# Patient Record
Sex: MALE | Race: WHITE | NOT HISPANIC OR LATINO | Employment: OTHER | URBAN - METROPOLITAN AREA
[De-identification: names, ages, dates, MRNs, and addresses within clinical notes are randomized per-mention and may not be internally consistent; named-entity substitution may affect disease eponyms.]

---

## 2018-08-31 ENCOUNTER — APPOINTMENT (OUTPATIENT)
Dept: PREADMISSION TESTING | Facility: HOSPITAL | Age: 78
End: 2018-08-31

## 2018-08-31 ENCOUNTER — OFFICE VISIT (OUTPATIENT)
Dept: ORTHOPEDIC SURGERY | Facility: CLINIC | Age: 78
End: 2018-08-31

## 2018-08-31 VITALS — OXYGEN SATURATION: 98 % | WEIGHT: 235.89 LBS | HEIGHT: 70 IN | BODY MASS INDEX: 33.77 KG/M2 | HEART RATE: 72 BPM

## 2018-08-31 VITALS — WEIGHT: 235 LBS | HEIGHT: 70 IN | BODY MASS INDEX: 33.64 KG/M2

## 2018-08-31 DIAGNOSIS — G62.9 NEUROPATHY: ICD-10-CM

## 2018-08-31 DIAGNOSIS — Z79.899 POLYPHARMACY: ICD-10-CM

## 2018-08-31 DIAGNOSIS — M25.572 ACUTE LEFT ANKLE PAIN: Primary | ICD-10-CM

## 2018-08-31 DIAGNOSIS — R73.03 PREDIABETES: ICD-10-CM

## 2018-08-31 DIAGNOSIS — I87.8 VENOUS STASIS: ICD-10-CM

## 2018-08-31 DIAGNOSIS — S82.852A CLOSED TRIMALLEOLAR FRACTURE OF LEFT ANKLE, INITIAL ENCOUNTER: ICD-10-CM

## 2018-08-31 DIAGNOSIS — M25.572 ACUTE LEFT ANKLE PAIN: ICD-10-CM

## 2018-08-31 LAB
ANION GAP SERPL CALCULATED.3IONS-SCNC: 1 MMOL/L (ref 3–11)
BASOPHILS # BLD AUTO: 0.02 10*3/MM3 (ref 0–0.2)
BASOPHILS NFR BLD AUTO: 0.2 % (ref 0–1)
BUN BLD-MCNC: 21 MG/DL (ref 9–23)
BUN/CREAT SERPL: 25 (ref 7–25)
CALCIUM SPEC-SCNC: 9.4 MG/DL (ref 8.7–10.4)
CHLORIDE SERPL-SCNC: 105 MMOL/L (ref 99–109)
CO2 SERPL-SCNC: 33 MMOL/L (ref 20–31)
CREAT BLD-MCNC: 0.84 MG/DL (ref 0.6–1.3)
DEPRECATED RDW RBC AUTO: 42.9 FL (ref 37–54)
EOSINOPHIL # BLD AUTO: 0.35 10*3/MM3 (ref 0–0.3)
EOSINOPHIL NFR BLD AUTO: 3.4 % (ref 0–3)
ERYTHROCYTE [DISTWIDTH] IN BLOOD BY AUTOMATED COUNT: 12.6 % (ref 11.3–14.5)
GFR SERPL CREATININE-BSD FRML MDRD: 89 ML/MIN/1.73
GLUCOSE BLD-MCNC: 107 MG/DL (ref 70–100)
HBA1C MFR BLD: 6.5 % (ref 4.8–5.6)
HCT VFR BLD AUTO: 43.9 % (ref 38.9–50.9)
HGB BLD-MCNC: 14.5 G/DL (ref 13.1–17.5)
IMM GRANULOCYTES # BLD: 0.06 10*3/MM3 (ref 0–0.03)
IMM GRANULOCYTES NFR BLD: 0.6 % (ref 0–0.6)
LYMPHOCYTES # BLD AUTO: 1.9 10*3/MM3 (ref 0.6–4.8)
LYMPHOCYTES NFR BLD AUTO: 18.5 % (ref 24–44)
MCH RBC QN AUTO: 31 PG (ref 27–31)
MCHC RBC AUTO-ENTMCNC: 33 G/DL (ref 32–36)
MCV RBC AUTO: 93.8 FL (ref 80–99)
MONOCYTES # BLD AUTO: 1 10*3/MM3 (ref 0–1)
MONOCYTES NFR BLD AUTO: 9.7 % (ref 0–12)
NEUTROPHILS # BLD AUTO: 7.02 10*3/MM3 (ref 1.5–8.3)
NEUTROPHILS NFR BLD AUTO: 68.2 % (ref 41–71)
PLATELET # BLD AUTO: 252 10*3/MM3 (ref 150–450)
PMV BLD AUTO: 11.3 FL (ref 6–12)
POTASSIUM BLD-SCNC: 4.1 MMOL/L (ref 3.5–5.5)
RBC # BLD AUTO: 4.68 10*6/MM3 (ref 4.2–5.76)
SODIUM BLD-SCNC: 139 MMOL/L (ref 132–146)
WBC NRBC COR # BLD: 10.29 10*3/MM3 (ref 3.5–10.8)

## 2018-08-31 PROCEDURE — 83036 HEMOGLOBIN GLYCOSYLATED A1C: CPT | Performed by: ORTHOPAEDIC SURGERY

## 2018-08-31 PROCEDURE — 80048 BASIC METABOLIC PNL TOTAL CA: CPT | Performed by: ORTHOPAEDIC SURGERY

## 2018-08-31 PROCEDURE — 93005 ELECTROCARDIOGRAM TRACING: CPT

## 2018-08-31 PROCEDURE — 99204 OFFICE O/P NEW MOD 45 MIN: CPT | Performed by: ORTHOPAEDIC SURGERY

## 2018-08-31 PROCEDURE — 36415 COLL VENOUS BLD VENIPUNCTURE: CPT

## 2018-08-31 PROCEDURE — 85025 COMPLETE CBC W/AUTO DIFF WBC: CPT | Performed by: ORTHOPAEDIC SURGERY

## 2018-08-31 PROCEDURE — 93010 ELECTROCARDIOGRAM REPORT: CPT | Performed by: INTERNAL MEDICINE

## 2018-08-31 RX ORDER — ASPIRIN 325 MG
325 TABLET ORAL DAILY
Status: ON HOLD | COMMUNITY
End: 2018-09-05

## 2018-08-31 RX ORDER — LOSARTAN POTASSIUM AND HYDROCHLOROTHIAZIDE 25; 100 MG/1; MG/1
1 TABLET ORAL DAILY
COMMUNITY

## 2018-08-31 RX ORDER — HYDROCODONE BITARTRATE AND ACETAMINOPHEN 7.5; 325 MG/1; MG/1
1-2 TABLET ORAL EVERY 6 HOURS PRN
Qty: 60 TABLET | Refills: 0 | Status: SHIPPED | OUTPATIENT
Start: 2018-08-31 | End: 2018-09-21

## 2018-08-31 RX ORDER — ONDANSETRON 4 MG/1
4 TABLET, FILM COATED ORAL EVERY 6 HOURS PRN
Qty: 30 TABLET | Refills: 0 | Status: SHIPPED | OUTPATIENT
Start: 2018-08-31 | End: 2018-08-31

## 2018-08-31 RX ORDER — OXYCODONE HYDROCHLORIDE AND ACETAMINOPHEN 5; 325 MG/1; MG/1
1-2 TABLET ORAL EVERY 6 HOURS PRN
Qty: 60 TABLET | Refills: 0 | Status: SHIPPED | OUTPATIENT
Start: 2018-08-31 | End: 2018-08-31

## 2018-08-31 RX ORDER — CARVEDILOL 12.5 MG/1
12.5 TABLET ORAL 2 TIMES DAILY WITH MEALS
COMMUNITY

## 2018-08-31 RX ORDER — ATORVASTATIN CALCIUM 20 MG/1
20 TABLET, FILM COATED ORAL DAILY
COMMUNITY

## 2018-08-31 RX ORDER — NAPROXEN 250 MG/1
250 TABLET ORAL 2 TIMES DAILY PRN
COMMUNITY
End: 2018-09-05 | Stop reason: HOSPADM

## 2018-08-31 RX ORDER — ASCORBIC ACID 500 MG
500 TABLET ORAL DAILY
COMMUNITY

## 2018-08-31 RX ORDER — CHOLECALCIFEROL (VITAMIN D3) 50 MCG
2000 TABLET ORAL DAILY
COMMUNITY

## 2018-09-03 ENCOUNTER — ANESTHESIA EVENT (OUTPATIENT)
Dept: PERIOP | Facility: HOSPITAL | Age: 78
End: 2018-09-03

## 2018-09-03 RX ORDER — FAMOTIDINE 10 MG/ML
20 INJECTION, SOLUTION INTRAVENOUS ONCE
Status: CANCELLED | OUTPATIENT
Start: 2018-09-03 | End: 2018-09-03

## 2018-09-04 ENCOUNTER — ANESTHESIA (OUTPATIENT)
Dept: PERIOP | Facility: HOSPITAL | Age: 78
End: 2018-09-04

## 2018-09-04 ENCOUNTER — APPOINTMENT (OUTPATIENT)
Dept: OTHER | Facility: HOSPITAL | Age: 78
End: 2018-09-04
Attending: ORTHOPAEDIC SURGERY

## 2018-09-04 ENCOUNTER — HOSPITAL ENCOUNTER (OUTPATIENT)
Facility: HOSPITAL | Age: 78
Discharge: HOME | End: 2018-09-05
Attending: ORTHOPAEDIC SURGERY | Admitting: ANESTHESIOLOGY

## 2018-09-04 ENCOUNTER — APPOINTMENT (OUTPATIENT)
Dept: GENERAL RADIOLOGY | Facility: HOSPITAL | Age: 78
End: 2018-09-04

## 2018-09-04 DIAGNOSIS — Z74.09 IMPAIRED FUNCTIONAL MOBILITY, BALANCE, GAIT, AND ENDURANCE: Primary | ICD-10-CM

## 2018-09-04 DIAGNOSIS — M25.572 ACUTE LEFT ANKLE PAIN: ICD-10-CM

## 2018-09-04 PROBLEM — I10 HTN (HYPERTENSION): Status: ACTIVE | Noted: 2018-09-04

## 2018-09-04 PROBLEM — E78.5 HLD (HYPERLIPIDEMIA): Status: ACTIVE | Noted: 2018-09-04

## 2018-09-04 PROBLEM — E03.9 HYPOTHYROID: Status: ACTIVE | Noted: 2018-09-04

## 2018-09-04 PROBLEM — Z87.81 S/P ORIF (OPEN REDUCTION INTERNAL FIXATION) FRACTURE: Status: ACTIVE | Noted: 2018-09-04

## 2018-09-04 PROBLEM — Z98.890 S/P ORIF (OPEN REDUCTION INTERNAL FIXATION) FRACTURE: Status: ACTIVE | Noted: 2018-09-04

## 2018-09-04 LAB
GLUCOSE BLDC GLUCOMTR-MCNC: 194 MG/DL (ref 70–130)
GLUCOSE BLDC GLUCOMTR-MCNC: 200 MG/DL (ref 70–130)
POTASSIUM BLDA-SCNC: 3.79 MMOL/L (ref 3.5–5.3)

## 2018-09-04 PROCEDURE — C1713 ANCHOR/SCREW BN/BN,TIS/BN: HCPCS | Performed by: ORTHOPAEDIC SURGERY

## 2018-09-04 PROCEDURE — 63710000001 CARVEDILOL 12.5 MG TABLET: Performed by: NURSE PRACTITIONER

## 2018-09-04 PROCEDURE — 27829 TREAT LOWER LEG JOINT: CPT | Performed by: ORTHOPAEDIC SURGERY

## 2018-09-04 PROCEDURE — 25010000002 MIDAZOLAM PER 1 MG: Performed by: ANESTHESIOLOGY

## 2018-09-04 PROCEDURE — 25010000002 BUPRENORPHINE PER 0.1 MG: Performed by: ANESTHESIOLOGY

## 2018-09-04 PROCEDURE — 25010000002 FENTANYL CITRATE (PF) 100 MCG/2ML SOLUTION: Performed by: ANESTHESIOLOGY

## 2018-09-04 PROCEDURE — A9270 NON-COVERED ITEM OR SERVICE: HCPCS | Performed by: ORTHOPAEDIC SURGERY

## 2018-09-04 PROCEDURE — 27822 TREATMENT OF ANKLE FRACTURE: CPT | Performed by: ORTHOPAEDIC SURGERY

## 2018-09-04 PROCEDURE — 76000 FLUOROSCOPY <1 HR PHYS/QHP: CPT

## 2018-09-04 PROCEDURE — 25810000003 POTASSIUM CHLORIDE PER 2 MEQ: Performed by: ORTHOPAEDIC SURGERY

## 2018-09-04 PROCEDURE — 94799 UNLISTED PULMONARY SVC/PX: CPT

## 2018-09-04 PROCEDURE — 25010000002 FENTANYL CITRATE (PF) 100 MCG/2ML SOLUTION: Performed by: NURSE ANESTHETIST, CERTIFIED REGISTERED

## 2018-09-04 PROCEDURE — A9270 NON-COVERED ITEM OR SERVICE: HCPCS | Performed by: NURSE PRACTITIONER

## 2018-09-04 PROCEDURE — 25010000002 DEXAMETHASONE SODIUM PHOSPHATE 10 MG/ML SOLUTION 1 ML VIAL: Performed by: ANESTHESIOLOGY

## 2018-09-04 PROCEDURE — 84132 ASSAY OF SERUM POTASSIUM: CPT | Performed by: ANESTHESIOLOGY

## 2018-09-04 PROCEDURE — 94760 N-INVAS EAR/PLS OXIMETRY 1: CPT

## 2018-09-04 PROCEDURE — 63710000001: Performed by: ORTHOPAEDIC SURGERY

## 2018-09-04 PROCEDURE — A9270 NON-COVERED ITEM OR SERVICE: HCPCS | Performed by: ANESTHESIOLOGY

## 2018-09-04 PROCEDURE — 63710000001 INSULIN LISPRO (HUMAN) PER 5 UNITS: Performed by: NURSE PRACTITIONER

## 2018-09-04 PROCEDURE — 25010000003 CEFAZOLIN IN DEXTROSE 2-4 GM/100ML-% SOLUTION: Performed by: ORTHOPAEDIC SURGERY

## 2018-09-04 PROCEDURE — 25010000002 PROPOFOL 10 MG/ML EMULSION: Performed by: NURSE ANESTHETIST, CERTIFIED REGISTERED

## 2018-09-04 PROCEDURE — 63710000001 MULTIVITAMIN TABLET: Performed by: ORTHOPAEDIC SURGERY

## 2018-09-04 PROCEDURE — C1769 GUIDE WIRE: HCPCS | Performed by: ORTHOPAEDIC SURGERY

## 2018-09-04 PROCEDURE — 76001 HC FLUORO GREATER THAN 1 HOUR: CPT

## 2018-09-04 PROCEDURE — 63710000001 FAMOTIDINE 20 MG TABLET: Performed by: ANESTHESIOLOGY

## 2018-09-04 PROCEDURE — 25010000002 ROPIVACAINE HCL-NACL 0.2-0.9 % SOLUTION: Performed by: ANESTHESIOLOGY

## 2018-09-04 PROCEDURE — 82962 GLUCOSE BLOOD TEST: CPT

## 2018-09-04 DEVICE — SCRW LK ST STRDRV 2.7X16MM: Type: IMPLANTABLE DEVICE | Site: ANKLE | Status: FUNCTIONAL

## 2018-09-04 DEVICE — SCRW CANN SHRT THRD 1/3 4X46MM: Type: IMPLANTABLE DEVICE | Site: ANKLE | Status: FUNCTIONAL

## 2018-09-04 DEVICE — SCRW CORT S/TAP 3.5X48MM: Type: IMPLANTABLE DEVICE | Site: ANKLE | Status: FUNCTIONAL

## 2018-09-04 DEVICE — SCRW CORT S/TAP 2.7X20MM: Type: IMPLANTABLE DEVICE | Site: ANKLE | Status: FUNCTIONAL

## 2018-09-04 DEVICE — SCRW CANC FUL/THRD 4.0X16MM: Type: IMPLANTABLE DEVICE | Site: ANKLE | Status: FUNCTIONAL

## 2018-09-04 DEVICE — PLT FIB LCP L/D 4H 2.7/3.5X86LT: Type: IMPLANTABLE DEVICE | Site: ANKLE | Status: FUNCTIONAL

## 2018-09-04 DEVICE — SCRW CORT S/TAP 3.5X18MM: Type: IMPLANTABLE DEVICE | Site: ANKLE | Status: FUNCTIONAL

## 2018-09-04 DEVICE — SCRW LK ST STRDRV 2.7X14MM: Type: IMPLANTABLE DEVICE | Site: ANKLE | Status: FUNCTIONAL

## 2018-09-04 DEVICE — SCRW CORT S/TAP 3.5X16MM: Type: IMPLANTABLE DEVICE | Site: ANKLE | Status: FUNCTIONAL

## 2018-09-04 DEVICE — SCRW LK ST STRDRV 2.7X18MM: Type: IMPLANTABLE DEVICE | Site: ANKLE | Status: FUNCTIONAL

## 2018-09-04 DEVICE — SCRW CANN SHRT THRD 1/3 4X44MM: Type: IMPLANTABLE DEVICE | Site: ANKLE | Status: FUNCTIONAL

## 2018-09-04 RX ORDER — ONDANSETRON 2 MG/ML
4 INJECTION INTRAMUSCULAR; INTRAVENOUS ONCE AS NEEDED
Status: DISCONTINUED | OUTPATIENT
Start: 2018-09-04 | End: 2018-09-04 | Stop reason: HOSPADM

## 2018-09-04 RX ORDER — PROPOFOL 10 MG/ML
VIAL (ML) INTRAVENOUS AS NEEDED
Status: DISCONTINUED | OUTPATIENT
Start: 2018-09-04 | End: 2018-09-04 | Stop reason: SURG

## 2018-09-04 RX ORDER — HYDRALAZINE HYDROCHLORIDE 20 MG/ML
5 INJECTION INTRAMUSCULAR; INTRAVENOUS
Status: DISCONTINUED | OUTPATIENT
Start: 2018-09-04 | End: 2018-09-04 | Stop reason: HOSPADM

## 2018-09-04 RX ORDER — CARVEDILOL 12.5 MG/1
12.5 TABLET ORAL 2 TIMES DAILY WITH MEALS
Status: DISCONTINUED | OUTPATIENT
Start: 2018-09-04 | End: 2018-09-05 | Stop reason: HOSPADM

## 2018-09-04 RX ORDER — BUPIVACAINE HYDROCHLORIDE 2.5 MG/ML
INJECTION, SOLUTION EPIDURAL; INFILTRATION; INTRACAUDAL AS NEEDED
Status: DISCONTINUED | OUTPATIENT
Start: 2018-09-04 | End: 2018-09-04 | Stop reason: SURG

## 2018-09-04 RX ORDER — ONDANSETRON 4 MG/1
4 TABLET, FILM COATED ORAL EVERY 6 HOURS PRN
Status: DISCONTINUED | OUTPATIENT
Start: 2018-09-04 | End: 2018-09-05 | Stop reason: HOSPADM

## 2018-09-04 RX ORDER — NALOXONE HCL 0.4 MG/ML
0.4 VIAL (ML) INJECTION AS NEEDED
Status: DISCONTINUED | OUTPATIENT
Start: 2018-09-04 | End: 2018-09-04 | Stop reason: HOSPADM

## 2018-09-04 RX ORDER — LIDOCAINE HYDROCHLORIDE 10 MG/ML
0.5 INJECTION, SOLUTION EPIDURAL; INFILTRATION; INTRACAUDAL; PERINEURAL ONCE AS NEEDED
Status: COMPLETED | OUTPATIENT
Start: 2018-09-04 | End: 2018-09-04

## 2018-09-04 RX ORDER — LOSARTAN POTASSIUM 50 MG/1
100 TABLET ORAL
Status: DISCONTINUED | OUTPATIENT
Start: 2018-09-05 | End: 2018-09-05 | Stop reason: HOSPADM

## 2018-09-04 RX ORDER — DIPHENHYDRAMINE HYDROCHLORIDE 50 MG/ML
25 INJECTION INTRAMUSCULAR; INTRAVENOUS NIGHTLY PRN
Status: DISCONTINUED | OUTPATIENT
Start: 2018-09-04 | End: 2018-09-05 | Stop reason: HOSPADM

## 2018-09-04 RX ORDER — BISACODYL 10 MG
10 SUPPOSITORY, RECTAL RECTAL DAILY PRN
Status: DISCONTINUED | OUTPATIENT
Start: 2018-09-04 | End: 2018-09-05 | Stop reason: HOSPADM

## 2018-09-04 RX ORDER — ONDANSETRON 2 MG/ML
4 INJECTION INTRAMUSCULAR; INTRAVENOUS EVERY 6 HOURS PRN
Status: DISCONTINUED | OUTPATIENT
Start: 2018-09-04 | End: 2018-09-05 | Stop reason: HOSPADM

## 2018-09-04 RX ORDER — OXYCODONE HYDROCHLORIDE AND ACETAMINOPHEN 5; 325 MG/1; MG/1
2 TABLET ORAL EVERY 4 HOURS PRN
Status: DISCONTINUED | OUTPATIENT
Start: 2018-09-04 | End: 2018-09-05 | Stop reason: HOSPADM

## 2018-09-04 RX ORDER — NALOXONE HCL 0.4 MG/ML
0.4 VIAL (ML) INJECTION
Status: DISCONTINUED | OUTPATIENT
Start: 2018-09-04 | End: 2018-09-05 | Stop reason: HOSPADM

## 2018-09-04 RX ORDER — PROMETHAZINE HYDROCHLORIDE 25 MG/ML
12.5 INJECTION, SOLUTION INTRAMUSCULAR; INTRAVENOUS EVERY 4 HOURS PRN
Status: DISCONTINUED | OUTPATIENT
Start: 2018-09-04 | End: 2018-09-05 | Stop reason: HOSPADM

## 2018-09-04 RX ORDER — LEVOTHYROXINE SODIUM 0.05 MG/1
50 TABLET ORAL
Status: DISCONTINUED | OUTPATIENT
Start: 2018-09-05 | End: 2018-09-05 | Stop reason: HOSPADM

## 2018-09-04 RX ORDER — HYDROCODONE BITARTRATE AND ACETAMINOPHEN 7.5; 325 MG/1; MG/1
2 TABLET ORAL EVERY 4 HOURS PRN
Status: DISCONTINUED | OUTPATIENT
Start: 2018-09-04 | End: 2018-09-05 | Stop reason: HOSPADM

## 2018-09-04 RX ORDER — PROMETHAZINE HYDROCHLORIDE 25 MG/ML
6.25 INJECTION, SOLUTION INTRAMUSCULAR; INTRAVENOUS ONCE AS NEEDED
Status: DISCONTINUED | OUTPATIENT
Start: 2018-09-04 | End: 2018-09-04 | Stop reason: HOSPADM

## 2018-09-04 RX ORDER — IPRATROPIUM BROMIDE AND ALBUTEROL SULFATE 2.5; .5 MG/3ML; MG/3ML
3 SOLUTION RESPIRATORY (INHALATION) ONCE AS NEEDED
Status: COMPLETED | OUTPATIENT
Start: 2018-09-04 | End: 2018-09-04

## 2018-09-04 RX ORDER — SODIUM CHLORIDE 0.9 % (FLUSH) 0.9 %
1-10 SYRINGE (ML) INJECTION AS NEEDED
Status: DISCONTINUED | OUTPATIENT
Start: 2018-09-04 | End: 2018-09-04 | Stop reason: HOSPADM

## 2018-09-04 RX ORDER — LABETALOL HYDROCHLORIDE 5 MG/ML
5 INJECTION, SOLUTION INTRAVENOUS
Status: DISCONTINUED | OUTPATIENT
Start: 2018-09-04 | End: 2018-09-04 | Stop reason: HOSPADM

## 2018-09-04 RX ORDER — DEXTROSE MONOHYDRATE 25 G/50ML
25 INJECTION, SOLUTION INTRAVENOUS
Status: DISCONTINUED | OUTPATIENT
Start: 2018-09-04 | End: 2018-09-05 | Stop reason: HOSPADM

## 2018-09-04 RX ORDER — HYDROCODONE BITARTRATE AND ACETAMINOPHEN 5; 325 MG/1; MG/1
1 TABLET ORAL ONCE AS NEEDED
Status: DISCONTINUED | OUTPATIENT
Start: 2018-09-04 | End: 2018-09-04 | Stop reason: HOSPADM

## 2018-09-04 RX ORDER — FAMOTIDINE 20 MG/1
20 TABLET, FILM COATED ORAL ONCE
Status: COMPLETED | OUTPATIENT
Start: 2018-09-04 | End: 2018-09-04

## 2018-09-04 RX ORDER — FENTANYL CITRATE 50 UG/ML
50 INJECTION, SOLUTION INTRAMUSCULAR; INTRAVENOUS
Status: DISCONTINUED | OUTPATIENT
Start: 2018-09-04 | End: 2018-09-04 | Stop reason: HOSPADM

## 2018-09-04 RX ORDER — NICOTINE POLACRILEX 4 MG
15 LOZENGE BUCCAL
Status: DISCONTINUED | OUTPATIENT
Start: 2018-09-04 | End: 2018-09-05 | Stop reason: HOSPADM

## 2018-09-04 RX ORDER — PROMETHAZINE HYDROCHLORIDE 25 MG/1
25 TABLET ORAL ONCE AS NEEDED
Status: DISCONTINUED | OUTPATIENT
Start: 2018-09-04 | End: 2018-09-04 | Stop reason: HOSPADM

## 2018-09-04 RX ORDER — ROPIVACAINE IN 0.9% SOD CHL/PF 0.2% 545ML
6 ELASTOMERIC PUMP, HI VARIABLE RATE INJECTION CONTINUOUS
Status: DISCONTINUED | OUTPATIENT
Start: 2018-09-04 | End: 2018-09-05 | Stop reason: HOSPADM

## 2018-09-04 RX ORDER — CEFAZOLIN SODIUM 2 G/100ML
2 INJECTION, SOLUTION INTRAVENOUS EVERY 8 HOURS
Status: DISCONTINUED | OUTPATIENT
Start: 2018-09-04 | End: 2018-09-05 | Stop reason: HOSPADM

## 2018-09-04 RX ORDER — FENTANYL CITRATE 50 UG/ML
INJECTION, SOLUTION INTRAMUSCULAR; INTRAVENOUS AS NEEDED
Status: DISCONTINUED | OUTPATIENT
Start: 2018-09-04 | End: 2018-09-04 | Stop reason: SURG

## 2018-09-04 RX ORDER — SODIUM CHLORIDE AND POTASSIUM CHLORIDE 150; 450 MG/100ML; MG/100ML
75 INJECTION, SOLUTION INTRAVENOUS CONTINUOUS
Status: DISCONTINUED | OUTPATIENT
Start: 2018-09-04 | End: 2018-09-05 | Stop reason: HOSPADM

## 2018-09-04 RX ORDER — MIDAZOLAM HYDROCHLORIDE 1 MG/ML
INJECTION INTRAMUSCULAR; INTRAVENOUS AS NEEDED
Status: DISCONTINUED | OUTPATIENT
Start: 2018-09-04 | End: 2018-09-04 | Stop reason: SURG

## 2018-09-04 RX ORDER — SODIUM CHLORIDE, SODIUM LACTATE, POTASSIUM CHLORIDE, CALCIUM CHLORIDE 600; 310; 30; 20 MG/100ML; MG/100ML; MG/100ML; MG/100ML
9 INJECTION, SOLUTION INTRAVENOUS CONTINUOUS
Status: DISCONTINUED | OUTPATIENT
Start: 2018-09-04 | End: 2018-09-05 | Stop reason: HOSPADM

## 2018-09-04 RX ORDER — PROMETHAZINE HYDROCHLORIDE 25 MG/1
25 SUPPOSITORY RECTAL ONCE AS NEEDED
Status: DISCONTINUED | OUTPATIENT
Start: 2018-09-04 | End: 2018-09-04 | Stop reason: HOSPADM

## 2018-09-04 RX ORDER — HYDROMORPHONE HYDROCHLORIDE 1 MG/ML
0.5 INJECTION, SOLUTION INTRAMUSCULAR; INTRAVENOUS; SUBCUTANEOUS
Status: DISCONTINUED | OUTPATIENT
Start: 2018-09-04 | End: 2018-09-04 | Stop reason: HOSPADM

## 2018-09-04 RX ORDER — CEFAZOLIN SODIUM 2 G/100ML
2 INJECTION, SOLUTION INTRAVENOUS ONCE
Status: COMPLETED | OUTPATIENT
Start: 2018-09-04 | End: 2018-09-04

## 2018-09-04 RX ORDER — LIDOCAINE HYDROCHLORIDE 10 MG/ML
INJECTION, SOLUTION EPIDURAL; INFILTRATION; INTRACAUDAL; PERINEURAL AS NEEDED
Status: DISCONTINUED | OUTPATIENT
Start: 2018-09-04 | End: 2018-09-04 | Stop reason: SURG

## 2018-09-04 RX ORDER — MEPERIDINE HYDROCHLORIDE 25 MG/ML
12.5 INJECTION INTRAMUSCULAR; INTRAVENOUS; SUBCUTANEOUS
Status: DISCONTINUED | OUTPATIENT
Start: 2018-09-04 | End: 2018-09-04 | Stop reason: HOSPADM

## 2018-09-04 RX ORDER — ALBUTEROL SULFATE 2.5 MG/3ML
2.5 SOLUTION RESPIRATORY (INHALATION) EVERY 6 HOURS PRN
Status: DISCONTINUED | OUTPATIENT
Start: 2018-09-04 | End: 2018-09-05 | Stop reason: HOSPADM

## 2018-09-04 RX ORDER — DIPHENHYDRAMINE HCL 25 MG
25 CAPSULE ORAL NIGHTLY PRN
Status: DISCONTINUED | OUTPATIENT
Start: 2018-09-04 | End: 2018-09-05 | Stop reason: HOSPADM

## 2018-09-04 RX ORDER — ATORVASTATIN CALCIUM 20 MG/1
20 TABLET, FILM COATED ORAL DAILY
Status: DISCONTINUED | OUTPATIENT
Start: 2018-09-05 | End: 2018-09-05 | Stop reason: HOSPADM

## 2018-09-04 RX ORDER — HYDROCODONE BITARTRATE AND ACETAMINOPHEN 7.5; 325 MG/1; MG/1
1 TABLET ORAL EVERY 4 HOURS PRN
Status: DISCONTINUED | OUTPATIENT
Start: 2018-09-04 | End: 2018-09-05 | Stop reason: HOSPADM

## 2018-09-04 RX ORDER — OXYCODONE HYDROCHLORIDE AND ACETAMINOPHEN 5; 325 MG/1; MG/1
1 TABLET ORAL EVERY 4 HOURS PRN
Status: DISCONTINUED | OUTPATIENT
Start: 2018-09-04 | End: 2018-09-05 | Stop reason: HOSPADM

## 2018-09-04 RX ORDER — LABETALOL HYDROCHLORIDE 5 MG/ML
10 INJECTION, SOLUTION INTRAVENOUS EVERY 4 HOURS PRN
Status: DISCONTINUED | OUTPATIENT
Start: 2018-09-04 | End: 2018-09-05 | Stop reason: HOSPADM

## 2018-09-04 RX ORDER — DIPHENOXYLATE HYDROCHLORIDE AND ATROPINE SULFATE 2.5; .025 MG/1; MG/1
1 TABLET ORAL DAILY
Status: DISCONTINUED | OUTPATIENT
Start: 2018-09-04 | End: 2018-09-05 | Stop reason: HOSPADM

## 2018-09-04 RX ORDER — MUPIROCIN CALCIUM 20 MG/G
CREAM TOPICAL AS NEEDED
Status: DISCONTINUED | OUTPATIENT
Start: 2018-09-04 | End: 2018-09-04 | Stop reason: HOSPADM

## 2018-09-04 RX ADMIN — LIDOCAINE HYDROCHLORIDE 0.5 ML: 10 INJECTION, SOLUTION EPIDURAL; INFILTRATION; INTRACAUDAL; PERINEURAL at 10:26

## 2018-09-04 RX ADMIN — PROPOFOL 125 MG: 10 INJECTION, EMULSION INTRAVENOUS at 12:58

## 2018-09-04 RX ADMIN — MIDAZOLAM HYDROCHLORIDE 2 MG: 1 INJECTION, SOLUTION INTRAMUSCULAR; INTRAVENOUS at 10:57

## 2018-09-04 RX ADMIN — CEFAZOLIN SODIUM 2 G: 2 INJECTION, SOLUTION INTRAVENOUS at 12:51

## 2018-09-04 RX ADMIN — POTASSIUM CHLORIDE AND SODIUM CHLORIDE 75 ML/HR: 450; 150 INJECTION, SOLUTION INTRAVENOUS at 18:51

## 2018-09-04 RX ADMIN — LIDOCAINE HYDROCHLORIDE 50 MG: 10 INJECTION, SOLUTION EPIDURAL; INFILTRATION; INTRACAUDAL; PERINEURAL at 12:58

## 2018-09-04 RX ADMIN — Medication 1 TABLET: at 18:50

## 2018-09-04 RX ADMIN — CARVEDILOL 12.5 MG: 12.5 TABLET, FILM COATED ORAL at 18:14

## 2018-09-04 RX ADMIN — Medication 6 ML/HR: at 15:10

## 2018-09-04 RX ADMIN — FENTANYL CITRATE 100 MCG: 50 INJECTION, SOLUTION INTRAMUSCULAR; INTRAVENOUS at 10:57

## 2018-09-04 RX ADMIN — INSULIN LISPRO 2 UNITS: 100 INJECTION, SOLUTION INTRAVENOUS; SUBCUTANEOUS at 21:18

## 2018-09-04 RX ADMIN — DEXAMETHASONE SODIUM PHOSPHATE 20 ML: 10 INJECTION, SOLUTION INTRAMUSCULAR; INTRAVENOUS at 10:48

## 2018-09-04 RX ADMIN — IPRATROPIUM BROMIDE AND ALBUTEROL SULFATE 3 ML: 2.5; .5 SOLUTION RESPIRATORY (INHALATION) at 16:03

## 2018-09-04 RX ADMIN — FENTANYL CITRATE 50 MCG: 50 INJECTION INTRAMUSCULAR; INTRAVENOUS at 16:00

## 2018-09-04 RX ADMIN — CEFAZOLIN SODIUM 1 G: 2 INJECTION, SOLUTION INTRAVENOUS at 13:35

## 2018-09-04 RX ADMIN — INSULIN LISPRO 3 UNITS: 100 INJECTION, SOLUTION INTRAVENOUS; SUBCUTANEOUS at 18:15

## 2018-09-04 RX ADMIN — SODIUM CHLORIDE, POTASSIUM CHLORIDE, SODIUM LACTATE AND CALCIUM CHLORIDE 9 ML/HR: 600; 310; 30; 20 INJECTION, SOLUTION INTRAVENOUS at 10:26

## 2018-09-04 RX ADMIN — CEFAZOLIN SODIUM 2 G: 2 INJECTION, SOLUTION INTRAVENOUS at 21:16

## 2018-09-04 RX ADMIN — BUPIVACAINE HYDROCHLORIDE 30 ML: 2.5 INJECTION, SOLUTION EPIDURAL; INFILTRATION; INTRACAUDAL; PERINEURAL at 10:57

## 2018-09-04 RX ADMIN — FENTANYL CITRATE 50 MCG: 50 INJECTION INTRAMUSCULAR; INTRAVENOUS at 16:13

## 2018-09-04 RX ADMIN — FAMOTIDINE 20 MG: 20 TABLET ORAL at 10:26

## 2018-09-04 NOTE — ANESTHESIA POSTPROCEDURE EVALUATION
Patient: Antione Crum    Procedure Summary     Date:  09/04/18 Room / Location:   [facility]    Anesthesia Start:  1252 Anesthesia Stop:  1529    Procedure:  OPEN REDUCTION INTERNAL FIXATION LEFT TRIMALL ANKLE FRACTURE   (Left Ankle) Diagnosis:       Acute left ankle pain      (Acute left ankle pain [M25.572])    Surgeon:  Cheryl Palmer MD Provider:  Damien Eugene MD    Anesthesia Type:  general ASA Status:  3          Anesthesia Type: general  Last vitals  BP   (!) 86/66 (09/04/18 1523)   Temp   98 °F (36.7 °C) (09/04/18 1523)   Pulse   81 (09/04/18 1523)   Resp   24 (09/04/18 1523)     SpO2   95 % (09/04/18 1523)     Post Anesthesia Care and Evaluation    Patient location during evaluation: PACU  Patient participation: complete - patient participated  Level of consciousness: responsive to verbal stimuli  Pain management: adequate  Airway patency: patent  Anesthetic complications: No anesthetic complications  PONV Status: none  Cardiovascular status: hemodynamically stable and acceptable  Respiratory status: nonlabored ventilation, acceptable and nasal cannula  Hydration status: acceptable

## 2018-09-04 NOTE — BRIEF OP NOTE
Orthopedics OPEN REDUCTION INTERNAL FIXATION LEFT TRIMALL ANKLE FRACTURE    Brief Op Note    Antione Crum  9/4/2018    Pre-op Diagnosis:   Acute left ankle pain [M25.572] left trimalleolar ankle fracture, syndesmotic disruption    Post-op Diagnosis:  same       Post-Op Diagnosis Codes:     * Acute left ankle pain [M25.572]    Procedure(s):  OPEN REDUCTION INTERNAL FIXATION LEFT TRIMALL ANKLE FRACTURE      Surgeon(s):  Cheryl Palmer MD    Anesthesia:  General with Block    Staff:   Circulator: Gena Arroyo RN; Marlyn Hernandez RN; Kayli Selby RN  Physician Assistant: Elissa Hyman PA  Radiology Technologist: Bk Brown RT; Yvette Nayak  Scrub Person: Sherly Pollack; Arturo Mahmood RN  Vendor Representative: Chase Chanel    Estimated Blood Loss:10cc      Specimens: none      Drains:  none    Complications:  None    Tourniquet:: 112min    Dressing:splint    Disposition:rr stable    Cheryl Palmer MD     Date: 9/4/2018  Time: 3:20 PM

## 2018-09-04 NOTE — OP NOTE
Operative Report    09/04/18  3:20 PM    Preoperative diagnosis: left trimalleolar ankle fracture with syndesmotic disruption    Postoperative diagnosis: Same    Anesthesia: Gen. with blocks for postop pain control    Surgeon: Cheryl Riggs M.D.    Assistant: baltazar BROWN, she was present for the entire procedure including prepping, draping, retraction, closure, dressing.        Modifier 22 is appended to this procedure because it became much more complex due to the patient's non-compliance.  He walked on the fracture and made it much worse, the bone was impacted and comminuted, much more difficult to reduce and fix both the fibula and the medial malleolus.  The procedure took double the time usual for this type of fracture.  (2 hours of tourniquet time instead of one)     Operative procedure:1.  ORIF left trimalleolar ankle fracture (without fixation of posterior fragment)  2. ORIF syndesmosis      Operative indications: This is a 78-year-old gentleman with a very difficult problem.  He sustained a displaced unstable left trimalleolar ankle fracture on August 21, 2018.  He was seen at [place] emergency room and placed in a splint.  He was then seen at [facility] orthopedics.  He proceeded to walk on the splint.  Surgery was recommended and he came to me on August 31, 2018, for a second opinion.  He walked into the office with a broken and dirty splint.  He reports he had only minimal pain.  He reports he does not have any history of neuropathy, but he must have some type of neuropathic problem in order to be able to walk on a displaced trimalleolar ankle fracture.  By the time I saw him in the office he had displaced the fracture further compared with the initial x-rays.  I recommended surgery because of the unstable nature of the fracture.  Patient reports he is not diabetic, but we found his hemoglobin A1c was 6.5 on admission.  I suspect that's where the neuropathy originates.  The patient also has severe  "venous stasis with Brown indurated skin from the ankle to the knee.  The skin is woody and thickened.  No active ulcerations at the time of surgery.  The swelling has diminished because we placed him in a new fiberglass splint and had him elevate all weekend.  However he has not been compliant with nonweightbearing.  He reports he \"tries to do it\" but has not been nonweightbearing.  I counseled him regarding the extremely high risk of complication including amputation if he walks on this.  The risk of hardware failure, Charcot joint, infection, and amputation are significant.  In this setting with neuropathy he has 50% risk of surgical complication from minor to severe.  I discussed this in depth with the patient and his wife.  I'm very concerned about his compliance.  I am concerned because he does not seem to be convinced regarding the importance of compliance.  I will discuss this in detail with his wife and any other family members also so they may help.    Operative procedure: The patient was taken to the operating room where general anesthesia was induced without difficulty.  They were given preoperative blocks and antibiotics.  The left leg was prepped and draped in the usual sterile fashion.  The appropriate timeout was called.  The leg was elevated, wrapped with an Esmarch, and the tourniquet inflated to 350 mmHg.  Tourniquet time was 114 minutes.  I made a 10 cm incision laterally over the fibula.  This was carried down through sharp tissue bluntly, care was taken to create full-thickness flaps.  The fibula was dissected subperiosteally.  It was very soft, and comminuted.  It also was impacted.  The distal fragment was impacted into the soft bone.  This was due to the preoperative ambulation.  I gently disimpacted it.  I was careful to avoid compressing the bone anymore.  The reduction was quite difficult because of the comminution and the compression.  All the granulation tissue was removed.  With " manipulation ultimately the fracture was reduced anatomically.  I pinned it initially with a K wire from the small fragment set.  I then chose a Synthes fibular plate and contoured it as needed.  It was placed across the fracture and the screw holes were filled in standard fashion.  They were adjusted for length as needed under C-arm guidance.  C-arm in the AP, lateral, mortise planes were used to make sure the alignment was anatomic and the fracture well reduced, and the screws were of the appropriate position and length.    Attention was then directed medially.  I made a 6 cm incision over the medial malleolus and carried this down through soft tissue bluntly.  The fracture was opened.  It was also quite comminuted.  I removed intervening periosteum and granulation tissue.  I was able to peek into the joint, there was some cartilage superficial scuffing.  This was irrigated thoroughly.  The fracture was then reduced anatomically and pinned with the guidewires for the 4.0 cannulated screws.  The appropriate screws were placed.  C-arm confirmed anatomic alignment.  A confirmed appropriate position and length of the hardware.  I then tested the syndesmosis and found it to be unstable.  I therefore placed a syndesmotic screw through the plate through the fibula and into the tibia.  This was done with the ankle held in neutral, and a bone-holding clamp compressing the syndesmosis.  C-arm again confirmed anatomic alignment, appropriate position and length of the hardware.  The incisions were irrigated copiously with antibiotic solution.  They were closed in layers with Monocryl and nylon.  The incisions were dressed sterilely.  He was placed in a 3 sided splint with the ankle in neutral.  Tourniquet was released.  He was awakened, extubated and transferred to recovery room in stable condition.  Postop plan will be admission for elevation, glucose control, pain control.       Estimated blood loss: 10 cc    Specimens:  None    Drains: None    Complications: None    Cheryl Palmer MD  09/04/18  3:20 PM

## 2018-09-04 NOTE — NURSING NOTE
Acute Pain Service:  Peripheral nerve catheter and disposable infusion device teaching completed with patient and spouse,Arminda.  Video demonstration, handout and bracelet provided with [facility] on call central phone number.  Instructed to call with any questions or concerns.  Patient verbalized understanding.  Service will continue to follow until catheter DC'd.  Please contact patient at [phone] or spouse Arminda at [phone] if needed.

## 2018-09-04 NOTE — H&P
Patient Name: Antione Crum  MRN: [id number]  : 1940  DOS: 2018    Attending: Cheryl Palmer MD    Primary Care Provider: Michael Kidd MD      Chief complaint:  left ankle pain    Subjective   Patient is a 78 y.o. male presented for ORIF left trimalleolar fracture by Dr. Palmer under [place]. He tolerated surgery well and is admitted for further medical management. He fractured the ankle in a fall on .    When seen in PACU she is drowsy and restless. He denies pain. He does report shortness of breath. He was 89% saturation on RA in preop. He denies nausea or chest pain. No hx of DVT or PE.     ( Above is noted/ agree. Seen in his room afterwards. Doing well. Fair pain control. No f/c/n/vom/sob. )wy    Allergies:  Allergies   Allergen Reactions   • Codeine GI Intolerance       Meds:  Prescriptions Prior to Admission   Medication Sig Dispense Refill Last Dose   • atorvastatin (LIPITOR) 20 MG tablet Take 20 mg by mouth Daily.   2018 at 0800   • carvedilol (COREG) 12.5 MG tablet Take 12.5 mg by mouth 2 (Two) Times a Day With Meals.   2018 at 0800   • Cholecalciferol (VITAMIN D) 2000 units tablet Take 2,000 Units by mouth Daily.   9/3/2018 at 0800   • HYDROcodone-acetaminophen (NORCO) 7.5-325 MG per tablet Take 1-2 tablets by mouth Every 6 (Six) Hours As Needed for Moderate Pain  (as needed for pain). 60 tablet 0 Past Week at Unknown time   • LEVOTHYROXINE SODIUM PO Take  by mouth Daily. Pt unsure of dosage   2018 at 0800   • losartan-hydrochlorothiazide (HYZAAR) 100-25 MG per tablet Take 1 tablet by mouth Daily.   2018 at 0800   • Multiple Vitamins-Minerals (MULTIVITAMIN ADULT PO) Take 1 tablet by mouth Daily.   9/3/2018 at 0800   • naproxen (NAPROSYN) 250 MG tablet Take 250 mg by mouth 2 (Two) Times a Day As Needed.   9/3/2018 at Unknown time   • vitamin C (ASCORBIC ACID) 500 MG tablet Take 500 mg by mouth Daily.   9/3/2018 at Unknown time   • aspirin 325 MG tablet Take 325 mg by mouth  "Daily. Stopped due to Naproxen   8/21/2018       History:   Past Medical History:   Diagnosis Date   • Arthritis     multi site    • Bronchiectasis (CMS/HCC)     causes SOA   • Dyspnea    • Elevated cholesterol    • History of fall 08/21/2018    injured left ankle   • Hypertension    • Hypothyroidism    • Presence of dental bridge     upper    • Suspected sleep apnea     tested three times but ruled out with third test      Past Surgical History:   Procedure Laterality Date   • COLONOSCOPY     • HAND SURGERY Bilateral     reconstruct thumb sockets    • TURP / TRANSURETHRAL INCISION / DRAINAGE PROSTATE  2015     Family History   Problem Relation Age of Onset   • Cancer Mother    • Hypertension Mother    • Cancer Father      Social History   Substance Use Topics   • Smoking status: Never Smoker   • Smokeless tobacco: Never Used   • Alcohol use Yes      Comment: 3-4 drinks per week    He is  with no children.    Review of Systems  Review of systems could not be obtained due to   patient sedation status.    Vital Signs  /96   Pulse 81   Temp 97.8 °F (36.6 °C) (Temporal Artery )   Resp 16   Ht 177.8 cm (70\")   Wt 107 kg (235 lb)   SpO2 90%   BMI 33.72 kg/m²     Physical Exam:    General Appearance:    Drowsy, in no acute distress   Head:    Normocephalic, without obvious abnormality, atraumatic   Ears:    Ears appear intact with no abnormalities noted   Neck:   No adenopathy, supple, trachea midline, no thyromegaly    Lungs:     GENA wheezing, diminished bases, respirations regular, even and unlabored    Heart:    Regular rhythm and normal rate, normal S1 and S2, no            murmur, no gallop, no rub    Abdomen:     Normal bowel sounds, no masses, no organomegaly, soft        non-tender, non-distended, no guarding, no rebound                 tenderness.obese.   Genitalia:    Deferred   Extremities:   Left foot splint CDI. Nerve block present. Good cap refill left toes   Pulses:   Pulses palpable and " equal bilaterally   Skin:   No bleeding, bruising or rash      I reviewed the patient's new clinical results.         Results from last 7 days  Lab Units 08/31/18  1210   WBC 10*3/mm3 10.29   HEMOGLOBIN g/dL 14.5   HEMATOCRIT % 43.9   PLATELETS 10*3/mm3 252       Results from last 7 days  Lab Units 08/31/18  1210   SODIUM mmol/L 139   POTASSIUM mmol/L 4.1   CHLORIDE mmol/L 105   CO2 mmol/L 33.0*   BUN mg/dL 21   CREATININE mg/dL 0.84   CALCIUM mg/dL 9.4   GLUCOSE mg/dL 107*     Lab Results   Component Value Date    HGBA1C 6.50 (H) 08/31/2018       Assessment and Plan:   Principal Problem:    S/P ORIF left trimalleolar fracture  Active Problems:    Acute left ankle pain    Prediabetes    HLD (hyperlipidemia)    HTN (hypertension)    Hypothyroid      Plan  1. PT/OT- NWB RLE  2. Pain control-prns, popliteal nerve block   3. IS-encourage  4. DVT proph- mechs/Lovenox  5. Bowel regimen  6. Resume home medications as appropriate  7. Monitor post-op labs  8. DC planning for home    Nebs PRN SOA    HTN, HLD  - Continue home statin, coreg and cozaar  - Monitor BP   - Holding parameters for BP meds  - Labetalol PRN for SBP>170    PreDM  - hgb A1c on 8/31/18 6.5  - Accuchecks ACHS with low dose SSI    Hypothyroid  - Nursing to clarify synthroid dose    I have personally performed the evaluation on this patient. My history is consistent  with HPI obtained. My exam findings are listed above. I have personally reviewed and discussed the above formulated treatment plan with pt and DILCIA. [email or web address].    RANDELL Bhatia  09/04/18  3:59 PM

## 2018-09-04 NOTE — ANESTHESIA PREPROCEDURE EVALUATION
Anesthesia Evaluation                  Airway   Mallampati: II  Dental      Pulmonary    Cardiovascular     (+) hypertension,       Neuro/Psych  GI/Hepatic/Renal/Endo    (+)   hypothyroidism,     Musculoskeletal     Abdominal    Substance History      OB/GYN          Other                        Anesthesia Plan    ASA 3     general     intravenous induction

## 2018-09-04 NOTE — ANESTHESIA PROCEDURE NOTES
Peripheral Block    Patient location during procedure: post-op  Reason for block: at surgeon's request and post-op pain management  Performed by  Anesthesiologist: KELSEY FLORES  CRNA: FANTA HUNTER  Preanesthetic Checklist  Completed: patient identified, site marked, surgical consent, pre-op evaluation, timeout performed, IV checked, risks and benefits discussed and monitors and equipment checked  Prep:  Sterile barriers:cap, gloves, mask and sterile barriers  Prep: ChloraPrep  Patient monitoring: blood pressure monitoring, continuous pulse oximetry and EKG  Procedure  Sedation:yes  Performed under: local infiltration  Guidance:ultrasound guided  Images:still images obtained    Block Type:adductor canal block  Injection Technique:single-shot  Needle Type:Tuohy and echogenic  Needle Gauge:20 G    Catheter size: 20g.  Medications  Comment:The Mix  Local Injected:bupivacaine 0.25% Local Amount Injected:20 (ml)mL  Post Assessment  Injection Assessment: negative aspiration for heme, incremental injection and no paresthesia on injection  Patient Tolerance:comfortable throughout block  Complications:no  Additional Notes  Procedure:             The pt was placed in the Supine position.  The Insertion site was  prepped and Draped in sterile fashion.  The pt was anesthetized with  IV Sedation( see meds).  Skin and cutaneous tissue was infiltrated and anesthetized with 1% Lidocaine 3 mls via a 25g needle.  A BBraun 4 inch 20g echogenic needle was then  inserted approximately midline, mid-thigh and advanced In-plane with Ultrasound guidance.  Normal Saline PSF was utilized for hydrodissection of tissue.  The Vastus medialis and Sartorius muscle where visualized and the needle tip was placed in the adductor canal,  lateral to the femoral artery.  LA injection spread was visualized, injection was incremental 1-5ml, injection pressure was normal or little, no intraneural injection, no vascular injection.  LA dose was  injected thru the needle(see dose above).

## 2018-09-04 NOTE — ANESTHESIA PROCEDURE NOTES
Peripheral Block    Patient location during procedure: pre-op  Stop time: 9/4/2018 11:09 AM  Reason for block: at surgeon's request and post-op pain management  Performed by  Anesthesiologist: KELSEY FLORES  CRNA: FANTA HUNTER  Assisted by: YURY MINAYA  Preanesthetic Checklist  Completed: patient identified, site marked, surgical consent, pre-op evaluation, timeout performed, IV checked, risks and benefits discussed and monitors and equipment checked  Prep:  Sterile barriers:cap, gloves, mask and sterile barriers  Prep: ChloraPrep  Patient monitoring: blood pressure monitoring, continuous pulse oximetry and EKG  Procedure  Sedation:yes    Guidance:ultrasound guided  Images:still images obtained    Laterality:left  Block Type:popliteal  Injection Technique:catheter  Needle Type:echogenic  Needle Gauge:18 G    Catheter Size:20 G  Cath Depth at skin: 9 cm  Medications  Local Injected:bupivacaine 0.25% Local Amount Injected:30 (mls)mL  Post Assessment  Injection Assessment: negative aspiration for heme, no paresthesia on injection and incremental injection  Patient Tolerance:comfortable throughout block  Complications:no  Additional Notes  Procedure:                                                         The pt was placed in  lateral position.  The Insertion site was  prepped and Draped in sterile fashion.  The pt was anesthetized with  IV Sedation( see meds).  Skin and cutaneous tissue was infiltrated and anesthetized with 1% Lidocaine 3 mls via a 25g needle.  A BBraun 4 inch 18g echogenic needle was then  inserted approximately 3 cm proximal to the popliteal cleo a at the lateral mid biceps femoris and advanced In-plane with Ultrasound guidance.  Normal Saline PSF was utilized for hydrodissection of tissue.  The popliteal artery was visualized and the common peroneal and tibial bifurcation was located.  LA injection spread was visualized, injection was incremental 1-5ml, injection pressure was normal or  little, no intraneural injection, no vascular injection.  .  A BBraun 20g wire stylet catheter was placed via the needle with ultrasound visualization and confirmation with NS fluid bolus. The labeled Catheter was then secured at insertions site with skin afix,  mastisol, steristrips  and a CHG transparent dressing was placed over. Thank you

## 2018-09-04 NOTE — H&P
Pre-Op H&P  Antione Crum  [id number]  1940    Chief complaint: left ankle pain    HPI:    Patient is a 78 y.o.male who presents with a left trimalleolar ankle fracture sustained after a fall in his garage on 08/21.    Review of Systems:  General ROS: negative for chills, fever or skin lesions;  No changes since last office visit  Cardiovascular ROS: no chest pain or dyspnea on exertion  Respiratory ROS: no cough, shortness of breath, or wheezing    Allergies:   Allergies   Allergen Reactions   • Codeine GI Intolerance       Home Meds:    No current facility-administered medications on file prior to encounter.      Current Outpatient Prescriptions on File Prior to Encounter   Medication Sig Dispense Refill   • atorvastatin (LIPITOR) 20 MG tablet Take 20 mg by mouth Daily.     • carvedilol (COREG) 12.5 MG tablet Take 12.5 mg by mouth 2 (Two) Times a Day With Meals.     • LEVOTHYROXINE SODIUM PO Take  by mouth Daily. Pt unsure of dosage     • losartan-hydrochlorothiazide (HYZAAR) 100-25 MG per tablet Take 1 tablet by mouth Daily.     • aspirin 325 MG tablet Take 325 mg by mouth Daily. Stopped due to Naproxen         PMH:   Past Medical History:   Diagnosis Date   • Arthritis     multi site    • Bronchiectasis (CMS/HCC)     causes SOA   • Dyspnea    • Elevated cholesterol    • History of fall 08/21/2018    injured left ankle   • Hypertension    • Hypothyroidism    • Presence of dental bridge     upper    • Suspected sleep apnea     tested three times but ruled out with third test      PSH:    Past Surgical History:   Procedure Laterality Date   • COLONOSCOPY     • HAND SURGERY Bilateral     reconstruct thumb sockets    • TURP / TRANSURETHRAL INCISION / DRAINAGE PROSTATE  2015       Immunization History:  Influenza: 2018  Pneumococcal: 2017  Tetanus: patient unsure    Social History:   Tobacco:   History   Smoking Status   • Never Smoker   Smokeless Tobacco   • Never Used      Alcohol:     History   Alcohol Use  "  • Yes     Comment: 3-4 drinks per week        Vitals:           /78 (BP Location: Right arm, Patient Position: Lying)   Pulse 75   Temp 98.5 °F (36.9 °C) (Temporal Artery )   Resp 20   Ht 177.8 cm (70\")   Wt 107 kg (235 lb)   SpO2 93%   BMI 33.72 kg/m²     Physical Exam:  General Appearance:    Alert, cooperative, no distress, appears stated age   Head:    Normocephalic, without obvious abnormality, atraumatic   Lungs:     Clear to auscultation bilaterally, respirations unlabored    Heart:   Regular rate and rhythm, S1 and S2 normal, no murmur, rub    or gallop    Abdomen:    Soft, non-tender.  +bowel sounds   Breast Exam:    deferred   Genitalia:    deferred   Extremities:   Extremities normal, atraumatic, no cyanosis or edema   Skin:   Skin color, texture, turgor normal, no rashes or lesions   Neurologic:   Grossly intact     Cancer Staging (if applicable)  Cancer Patient: __ yes _x_no __unknown; If yes, clinical stage T:__ N:__M:__, stage group or __N/A    Impression: Left trimalleolar ankle fracture    Plan: Open reduction, internal fixation of left ankle fracture    STEPHANIE Mitchell   9/4/2018   11:09 AM  "

## 2018-09-05 VITALS
RESPIRATION RATE: 16 BRPM | HEART RATE: 104 BPM | SYSTOLIC BLOOD PRESSURE: 134 MMHG | BODY MASS INDEX: 33.64 KG/M2 | OXYGEN SATURATION: 91 % | DIASTOLIC BLOOD PRESSURE: 61 MMHG | TEMPERATURE: 97.9 F | HEIGHT: 70 IN | WEIGHT: 235 LBS

## 2018-09-05 PROBLEM — G89.18 ACUTE POSTOPERATIVE PAIN: Status: ACTIVE | Noted: 2018-09-05

## 2018-09-05 PROBLEM — D72.829 LEUKOCYTOSIS: Status: ACTIVE | Noted: 2018-09-05

## 2018-09-05 LAB
ANION GAP SERPL CALCULATED.3IONS-SCNC: 5 MMOL/L (ref 3–11)
BUN BLD-MCNC: 22 MG/DL (ref 9–23)
BUN/CREAT SERPL: 22.9 (ref 7–25)
CALCIUM SPEC-SCNC: 8.6 MG/DL (ref 8.7–10.4)
CHLORIDE SERPL-SCNC: 101 MMOL/L (ref 99–109)
CO2 SERPL-SCNC: 31 MMOL/L (ref 20–31)
CREAT BLD-MCNC: 0.96 MG/DL (ref 0.6–1.3)
DEPRECATED RDW RBC AUTO: 44.3 FL (ref 37–54)
ERYTHROCYTE [DISTWIDTH] IN BLOOD BY AUTOMATED COUNT: 12.7 % (ref 11.3–14.5)
GFR SERPL CREATININE-BSD FRML MDRD: 76 ML/MIN/1.73
GLUCOSE BLD-MCNC: 177 MG/DL (ref 70–100)
GLUCOSE BLDC GLUCOMTR-MCNC: 169 MG/DL (ref 70–130)
GLUCOSE BLDC GLUCOMTR-MCNC: 177 MG/DL (ref 70–130)
HCT VFR BLD AUTO: 41.6 % (ref 38.9–50.9)
HGB BLD-MCNC: 13.6 G/DL (ref 13.1–17.5)
MCH RBC QN AUTO: 31.2 PG (ref 27–31)
MCHC RBC AUTO-ENTMCNC: 32.7 G/DL (ref 32–36)
MCV RBC AUTO: 95.4 FL (ref 80–99)
PLATELET # BLD AUTO: 252 10*3/MM3 (ref 150–450)
PMV BLD AUTO: 11 FL (ref 6–12)
POTASSIUM BLD-SCNC: 4.1 MMOL/L (ref 3.5–5.5)
RBC # BLD AUTO: 4.36 10*6/MM3 (ref 4.2–5.76)
SODIUM BLD-SCNC: 137 MMOL/L (ref 132–146)
WBC NRBC COR # BLD: 16.23 10*3/MM3 (ref 3.5–10.8)

## 2018-09-05 PROCEDURE — 97161 PT EVAL LOW COMPLEX 20 MIN: CPT

## 2018-09-05 PROCEDURE — A9270 NON-COVERED ITEM OR SERVICE: HCPCS | Performed by: ORTHOPAEDIC SURGERY

## 2018-09-05 PROCEDURE — 94640 AIRWAY INHALATION TREATMENT: CPT

## 2018-09-05 PROCEDURE — 63710000001 MULTIVITAMIN TABLET: Performed by: ORTHOPAEDIC SURGERY

## 2018-09-05 PROCEDURE — 63710000001 LEVOTHYROXINE 50 MCG TABLET: Performed by: ORTHOPAEDIC SURGERY

## 2018-09-05 PROCEDURE — G0378 HOSPITAL OBSERVATION PER HR: HCPCS

## 2018-09-05 PROCEDURE — 63710000001 CARVEDILOL 12.5 MG TABLET: Performed by: NURSE PRACTITIONER

## 2018-09-05 PROCEDURE — G8978 MOBILITY CURRENT STATUS: HCPCS

## 2018-09-05 PROCEDURE — G8979 MOBILITY GOAL STATUS: HCPCS

## 2018-09-05 PROCEDURE — 63710000001 ATORVASTATIN 20 MG TABLET: Performed by: NURSE PRACTITIONER

## 2018-09-05 PROCEDURE — 25010000002 ENOXAPARIN PER 10 MG: Performed by: ORTHOPAEDIC SURGERY

## 2018-09-05 PROCEDURE — 80048 BASIC METABOLIC PNL TOTAL CA: CPT | Performed by: NURSE PRACTITIONER

## 2018-09-05 PROCEDURE — 99024 POSTOP FOLLOW-UP VISIT: CPT | Performed by: ORTHOPAEDIC SURGERY

## 2018-09-05 PROCEDURE — 25010000003 CEFAZOLIN IN DEXTROSE 2-4 GM/100ML-% SOLUTION: Performed by: ORTHOPAEDIC SURGERY

## 2018-09-05 PROCEDURE — 82962 GLUCOSE BLOOD TEST: CPT

## 2018-09-05 PROCEDURE — A9270 NON-COVERED ITEM OR SERVICE: HCPCS | Performed by: NURSE PRACTITIONER

## 2018-09-05 PROCEDURE — 97116 GAIT TRAINING THERAPY: CPT

## 2018-09-05 PROCEDURE — 85027 COMPLETE CBC AUTOMATED: CPT | Performed by: NURSE PRACTITIONER

## 2018-09-05 PROCEDURE — 63710000001 LOSARTAN 50 MG TABLET: Performed by: NURSE PRACTITIONER

## 2018-09-05 PROCEDURE — G8980 MOBILITY D/C STATUS: HCPCS

## 2018-09-05 RX ORDER — DOCUSATE SODIUM 100 MG/1
100 CAPSULE, LIQUID FILLED ORAL 2 TIMES DAILY
Qty: 60 CAPSULE | Refills: 0 | Status: SHIPPED | OUTPATIENT
Start: 2018-09-05 | End: 2018-09-21

## 2018-09-05 RX ORDER — ROPIVACAINE IN 0.9% SOD CHL/PF 0.2% 545ML
6 ELASTOMERIC PUMP, HI VARIABLE RATE INJECTION CONTINUOUS
Start: 2018-09-05 | End: 2018-09-21

## 2018-09-05 RX ORDER — OXYCODONE HYDROCHLORIDE AND ACETAMINOPHEN 5; 325 MG/1; MG/1
1 TABLET ORAL EVERY 4 HOURS PRN
Start: 2018-09-05 | End: 2018-09-14

## 2018-09-05 RX ORDER — ASPIRIN 325 MG
325 TABLET ORAL DAILY
Start: 2018-09-05 | End: 2020-08-17

## 2018-09-05 RX ADMIN — LOSARTAN POTASSIUM 100 MG: 50 TABLET ORAL at 08:26

## 2018-09-05 RX ADMIN — INSULIN LISPRO 2 UNITS: 100 INJECTION, SOLUTION INTRAVENOUS; SUBCUTANEOUS at 08:26

## 2018-09-05 RX ADMIN — Medication 1 TABLET: at 08:25

## 2018-09-05 RX ADMIN — ALBUTEROL SULFATE 2.5 MG: 2.5 SOLUTION RESPIRATORY (INHALATION) at 09:30

## 2018-09-05 RX ADMIN — LEVOTHYROXINE SODIUM 50 MCG: 50 TABLET ORAL at 08:00

## 2018-09-05 RX ADMIN — ENOXAPARIN SODIUM 40 MG: 40 INJECTION SUBCUTANEOUS at 08:26

## 2018-09-05 RX ADMIN — CARVEDILOL 12.5 MG: 12.5 TABLET, FILM COATED ORAL at 08:26

## 2018-09-05 RX ADMIN — ATORVASTATIN CALCIUM 20 MG: 20 TABLET, FILM COATED ORAL at 08:25

## 2018-09-05 RX ADMIN — CEFAZOLIN SODIUM 2 G: 2 INJECTION, SOLUTION INTRAVENOUS at 08:00

## 2018-09-05 RX ADMIN — INSULIN LISPRO 2 UNITS: 100 INJECTION, SOLUTION INTRAVENOUS; SUBCUTANEOUS at 11:32

## 2018-09-05 NOTE — PROGRESS NOTES
Discharge Planning Assessment  [facility]     Patient Name: Antione Crum  MRN: [id number]  Today's Date: 9/5/2018    Admit Date: 9/4/2018          Discharge Needs Assessment     Row Name 09/05/18 1646       Living Environment    Lives With spouse    Name(s) of Who Lives With Patient Arminda Crum Spouse [phone]    Current Living Arrangements home/apartment/condo    Primary Care Provided by self    Provides Primary Care For no one    Family Caregiver if Needed spouse    Quality of Family Relationships helpful;involved;supportive    Able to Return to Prior Arrangements yes       Resource/Environmental Concerns    Resource/Environmental Concerns none    Home Accessibility Concerns stairs to enter home;not wheelchair accessible       Transition Planning    Patient/Family Anticipates Transition to home with family    Patient/Family Anticipated Services at Transition     Transportation Anticipated family or friend will provide       Discharge Needs Assessment    Readmission Within the Last 30 Days no previous admission in last 30 days    Concerns to be Addressed discharge planning    Equipment Currently Used at Home nebulizer    Anticipated Changes Related to Illness none    Equipment Needed After Discharge walker, rolling;crutches    Offered/Gave Vendor List no            Discharge Plan     Row Name 09/05/18 3876       Plan    Plan Home with family    Patient/Family in Agreement with Plan yes    Plan Comments Spoke with patient at bedside.  Patient lives at home with his wife.  States that the only equipment that he currently uses is a nebulizer PRN.  He anticipates discharge home when medically ready.  He will likely need crutches or a walker which he states he has at home alreay.  He does have 2 stairs to enter his home, but his son is buiding him a ramp from his garage.  Confirmed with patient that his PCP is Michael Kidd MD and his insurance is Medicare A.  Patient has private transportation home  provided by family.  Patient has no additional needs at this time.  Case management will continue to follow.    Final Discharge Disposition Code 01 - home or self-care        Destination     No service coordination in this encounter.      Durable Medical Equipment     No service coordination in this encounter.      Dialysis/Infusion     No service coordination in this encounter.      Home Medical Care     No service coordination in this encounter.      Social Care     No service coordination in this encounter.        Expected Discharge Date and Time     Expected Discharge Date Expected Discharge Time    Sep 5, 2018               Demographic Summary     Row Name 09/05/18 1640       General Information    Admission Type observation    Arrived From home    Referral Source admission list    Reason for Consult discharge planning    Preferred Language English     Used During This Interaction no    General Information Comments PCP Michael STEVENSON MD confirmed with patient            Functional Status     Row Name 09/05/18 1649       Functional Status    Usual Activity Tolerance excellent    Current Activity Tolerance excellent       Functional Status, IADL    Medications independent    Meal Preparation independent    Housekeeping independent    Laundry independent    Shopping independent            Psychosocial    No documentation.           Abuse/Neglect    No documentation.           Legal    No documentation.           Substance Abuse    No documentation.           Patient Forms    No documentation.         Tammy Gardiner RN

## 2018-09-05 NOTE — PLAN OF CARE
Problem: Patient Care Overview  Goal: Plan of Care Review  Outcome: Ongoing (interventions implemented as appropriate)   09/05/18 0508   Coping/Psychosocial   Plan of Care Reviewed With patient   Plan of Care Review   Progress improving       Problem: Fall Risk (Adult)  Goal: Identify Related Risk Factors and Signs and Symptoms  Outcome: Ongoing (interventions implemented as appropriate)   09/05/18 0508   Fall Risk (Adult)   Related Risk Factors (Fall Risk) gait/mobility problems;environment unfamiliar   Signs and Symptoms (Fall Risk) presence of risk factors

## 2018-09-05 NOTE — PROGRESS NOTES
Orthopedic Foot/Ankle Progress Note    Subjective     Post-Operative Day: 1 Day Post-Op    Systemic or Specific Complaints: s/p ORIF ankle fx, left.  Diabetes, neuropathy.  Pain well controlled.  Has seen DM educator and PT  Objective     Vital signs in last 24 hours:  Temp:  [97.8 °F (36.6 °C)-98.3 °F (36.8 °C)] 97.9 °F (36.6 °C)  Heart Rate:  [] 104  Resp:  [12-24] 16  BP: ()/(55-96) 134/61    Neurovascular: Toes pink, wiggle left    Wound: Splint dry         Data Review  Lab Results (last 24 hours)     Procedure Component Value Units Date/Time    POC Glucose Once [[id number]]  (Abnormal) Collected:  09/05/18 1110    Specimen:  Blood Updated:  09/05/18 1123     Glucose 177 (H) mg/dL     Narrative:       Meter: [id number] : [id number] [employer]orix    POC Glucose Once [[id number]]  (Abnormal) Collected:  09/05/18 0749    Specimen:  Blood Updated:  09/05/18 0751     Glucose 169 (H) mg/dL     Narrative:       Meter: [id number] : [id number] [employer]FlowPlay    Basic Metabolic Panel [[id number]]  (Abnormal) Collected:  09/05/18 0359    Specimen:  Blood Updated:  09/05/18 0525     Glucose 177 (H) mg/dL      BUN 22 mg/dL      Creatinine 0.96 mg/dL      Sodium 137 mmol/L      Potassium 4.1 mmol/L      Chloride 101 mmol/L      CO2 31.0 mmol/L      Calcium 8.6 (L) mg/dL      eGFR Non African Amer 76 mL/min/1.73      BUN/Creatinine Ratio 22.9     Anion Gap 5.0 mmol/L     Narrative:       National Kid[employer] Guidelines    Stage     Description        GFR  1         Normal or High     90+  2         Mild decrease      60-89  3         Moderate decrease  30-59  4         Severe decrease    15-29  5         Kidney failure     <15    The MDRD GFR formula is only valid for adults with stable renal function between ages 18 and 70.    CBC (No Diff) [[id number]]  (Abnormal) Collected:  09/05/18 0359    Specimen:  Blood Updated:  09/05/18 0426     WBC 16.23 (H) 10*3/mm3      RBC 4.36 10*6/mm3      Hemoglobin 13.6 g/dL       Hematocrit 41.6 %      MCV 95.4 fL      MCH 31.2 (H) pg      MCHC 32.7 g/dL      RDW 12.7 %      RDW-SD 44.3 fl      MPV 11.0 fL      Platelets 252 10*3/mm3     POC Glucose Once [[id number]]  (Abnormal) Collected:  09/04/18 2022    Specimen:  Blood Updated:  09/04/18 2024     Glucose 194 (H) mg/dL     Narrative:       Meter: [id number] : [id number] Camilla Parkerws    POC Glucose Once [[id number]]  (Abnormal) Collected:  09/04/18 1717    Specimen:  Blood Updated:  09/04/18 1738     Glucose 200 (H) mg/dL     Narrative:       Meter: [id number] : [id number] Adal Mark            Assessment/Plan     Status post- stable, ok to go home.  I STRONGLY EMPHASIZED THE NEED TO BE NON-WT BEARING.  Very high risk injury given underlying neuropathy, poor skin, venous stasis, diabetes.  lovenox 2 weeks, return 2 weeks or sooner if any problems           Cheryl Palmer MD    Date: 9/5/2018  Time: 12:32 PM

## 2018-09-05 NOTE — CONSULTS
"Diabetes Education  Assessment/Teaching    Patient Name:  Antione Crum  YOB: 1940  MRN: [id number]  Admit Date:  9/4/2018      Assessment Date:  9/5/2018    Most Recent Value   General Information    Referral From:  MD garza   Height  177.8 cm (70\")   Height Method  Stated   Weight  107 kg (235 lb)   Weight Method  -- [confirmed with pt]   Pregnancy Assessment   Diabetes History   What type of diabetes do you have?  Type 2   Length of Diabetes Diagnosis  Newly diagnosed <6 months   Current DM knowledge  fair   Have you had diabetes education/teaching in the past?  no   Do you test your blood sugar at home?  no   How do you feel about having diabetes?  Patient states he has been told for many years he has prediabetes   Education Preferences   Nutrition Information   Assessment Topics   Healthy Eating - Assessment  Needs education   Being Active - Assessment  Needs education   Taking Medication - Assessment  Needs education   Problem Solving - Assessment  Needs education   Reducing Risk - Assessment  Needs education   Healthy Coping - Assessment  Needs education   Monitoring - Assessment  Needs education   DM Goals            Most Recent Value   DM Education Needs   Meter  -- [declined meter today, says he has a meter at home ([employer] brand) that he bought for his wife]   Blood Glucose Target Range  per ADA   Problem Solving  Hypoglycemia, Hyperglycemia, Sick days, Signs, Symptoms, Treatment   Reducing Risks  A1C testing   Healthy Eating  -- [plate method, provided Where Do I Begin book, encouarged OP education]   Physical Activity  None   Physical Activity Frequency  Never   Healthy Coping  Appropriate   Discharge Plan  Home   Motivation  Moderate   Teaching Method  Explanation, Discussion, Handouts, Teach back   Patient Response  Verbalized understanding          Patient was seen for new diagnosis of type 2 diabetes. Discussed and taught patient about type 2 diabetes self-management, risk factors, " "and importance of blood glucose control to reduce complications. Target blood glucose readings and A1c goals per ADA were reviewed. A1c was 6.5%. Patient stated he has been told for many years that he has \"borderline diabetes.\"  Reviewed with patient current A1c and discussed its significance. Signs, symptoms and treatment of hyperglycemia and hypoglycemia were discussed. Lifestyle changes such as physical activity with MD approval and healthy eating were encouraged.   OP education was also encouraged for additional education once discharged.  Provided information on OP class in [place].          Electronically signed by:  Karina Bravo RN  09/05/18 11:22 AM  "

## 2018-09-05 NOTE — DISCHARGE SUMMARY
Patient Name: Antione Crum  MRN: [id number]  : 1940  DOS: 2018    Attending: Cheryl Palmer MD    Primary Care Provider: Michael Kidd MD    Date of Admission:.2018 10:09 AM    Date of Discharge:  2018    Discharge Diagnosis: Principal Problem:    S/P ORIF left trimalleolar fracture  Active Problems:    Acute left ankle pain    Prediabetes    HLD (hyperlipidemia)    HTN (hypertension)    Hypothyroid    Leukocytosis, likely reactive    Acute postoperative pain      Hospital Course  Patient is a 78 y.o. male presented for ORIF left trimalleolar fracture by Dr. Palmer under GA. He tolerated surgery well and was admitted for further medical management. He fractured the ankle in a fall on .      Patient was provided pain medications as needed for pain control, along with popliteal nerve block infusion of Ropivacaine.    Adjustments were made to pain medications to optimize postop pain management. Risks and benefits of opiate medications discussed with patient.    He was seen by PT has progressed well over his stay.  He used an IS for atelectasis prophylaxis and Lovenox along with mechanicals for DVT prophylaxis.  Home medications were resumed as appropriate, and labs were monitored and remained fairly stable.     With the progress he has made, he is ready for DC home today.    Keep splint clean and dry until follow up appointment with Dr. Palmer.  He will have an On Q pump ( instructed on it during this admit)  Discussed with patient regarding plan and he shows understanding and agreement.        Procedures Performed  18  3:20 PM     Preoperative diagnosis: left trimalleolar ankle fracture with syndesmotic disruption     Postoperative diagnosis: Same     Anesthesia: Gen. with blocks for postop pain control     Surgeon: Cheryl Palmer M.D.     Assistant: baltazar BROWN, she was present for the entire procedure including prepping, draping, retraction, closure, dressing.     Operative  "procedure:1.  ORIF left trimalleolar ankle fracture (without fixation of posterior fragment)  2. ORIF syndesmosis        Pertinent Test Results:    I reviewed the patient's new clinical results.     Results from last 7 days  Lab Units 18  0359 18  1210   WBC 10*3/mm3 16.23* 10.29   HEMOGLOBIN g/dL 13.6 14.5   HEMATOCRIT % 41.6 43.9   PLATELETS 10*3/mm3 252 252       Results from last 7 days  Lab Units 18  0359 18  1210   SODIUM mmol/L 137 139   POTASSIUM mmol/L 4.1 4.1   CHLORIDE mmol/L 101 105   CO2 mmol/L 31.0 33.0*   BUN mg/dL 22 21   CREATININE mg/dL 0.96 0.84   CALCIUM mg/dL 8.6* 9.4   GLUCOSE mg/dL 177* 107*     Results for ESPERANZA LAWSON (MRN [id number]) as of 2018 12:51   Ref. Range 2018 20:22 2018 03:59 2018 07:49 2018 11:10   Glucose Latest Ref Range: 70 - 130 mg/dL 194 (H) 177 (H) 169 (H) 177 (H)     I reviewed the patient's new imaging including images and reports.      Physical therapy: Patient ambulated 400 feet with rolling knee walker with no difficulty. Attempted stair training with axillary crutches, patient able to climb one step with assist but unable to climb 2nd step to simulate entrance to home. Instructed patient to go up steps on his bottom or have ramp placed and use w/c to access home. Reinforced importance of maintaining NWB on L LE with mobility. Patient has been d/c home with family today.     Discharge Assessment:    Vital Signs  /61 (BP Location: Left arm, Patient Position: Lying)   Pulse 104   Temp 97.9 °F (36.6 °C) (Oral)   Resp 16   Ht 177.8 cm (70\")   Wt 107 kg (235 lb)   SpO2 91%   BMI 33.72 kg/m²   Temp (24hrs), Av °F (36.7 °C), Min:97.8 °F (36.6 °C), Max:98.3 °F (36.8 °C)      General Appearance:    Alert, cooperative, in no acute distress   Lungs:     Clear to auscultation,respirations regular, even and                   unlabored    Heart:    Regular rhythm and normal rate, normal S1 and S2   Abdomen:     Normal " bowel sounds, no masses, no organomegaly, soft        non-tender, non-distended, no guarding, no rebound                 tenderness   Extremities:     Left foot splint CDI. Nerve block present. Good cap refill left toes   Pulses:   Pulses palpable and equal bilaterally   Skin:   No bleeding, bruising or rash   Neurologic:   Cranial nerves 2 - 12 grossly intact, sensation intact       Discharge Disposition: Home    Discharge Medications     Discharge Medications      New Medications      Instructions Start Date   docusate sodium 100 MG capsule  Commonly known as:  COLACE   100 mg, Oral, 2 Times Daily      enoxaparin 40 MG/0.4ML solution syringe  Commonly known as:  LOVENOX   40 mg, Subcutaneous, Daily, For 2 weeks      oxyCODONE-acetaminophen 5-325 MG per tablet  Commonly known as:  PERCOCET   1 tablet, Oral, Every 4 Hours PRN      Ropivacaine HCl-NaCl 0.2-0.9 %  Commonly known as:  NAROPIN   6 mL/hr, Peripheral Nerve, Continuous         Changes to Medications      Instructions Start Date   aspirin 325 MG tablet  What changed:  additional instructions   325 mg, Oral, Daily, Resume in 2 weeks         Continue These Medications      Instructions Start Date   atorvastatin 20 MG tablet  Commonly known as:  LIPITOR   20 mg, Oral, Daily      carvedilol 12.5 MG tablet  Commonly known as:  COREG   12.5 mg, Oral, 2 Times Daily With Meals      HYDROcodone-acetaminophen 7.5-325 MG per tablet  Commonly known as:  NORCO   1-2 tablets, Oral, Every 6 Hours PRN      LEVOTHYROXINE SODIUM PO   Oral, Daily, Pt unsure of dosage      losartan-hydrochlorothiazide 100-25 MG per tablet  Commonly known as:  HYZAAR   1 tablet, Oral, Daily      MULTIVITAMIN ADULT PO   1 tablet, Oral, Daily      vitamin C 500 MG tablet  Commonly known as:  ASCORBIC ACID   500 mg, Oral, Daily      Vitamin D 2000 units tablet   2,000 Units, Oral, Daily         Stop These Medications    naproxen 250 MG tablet  Commonly known as:  NAPROSYN            Discharge  Diet: Consistent carb diet    Activity at Discharge: YENI LITTLEE    Follow-up Appointments  Dr. Palmer per her orders      RANDELL Bhatia  09/05/18  12:51 PM

## 2018-09-05 NOTE — THERAPY DISCHARGE NOTE
Acute Care - Physical Therapy Initial Eval/Discharge  [facility]     Patient Name: Antione Crum  : 1940  MRN: [id number]  Today's Date: 2018   Onset of Illness/Injury or Date of Surgery: 18  Date of Referral to PT: 18  Referring Physician: MD Spencer      Admit Date: 2018    Visit Dx:    ICD-10-CM ICD-9-CM   1. Impaired functional mobility, balance, gait, and endurance Z74.09 V49.89   2. Acute left ankle pain M25.572 719.47     Patient Active Problem List   Diagnosis   • Acute left ankle pain   • Venous stasis   • Neuropathy   • Prediabetes   • Closed trimalleolar fracture of left ankle   • S/P ORIF left trimalleolar fracture   • HLD (hyperlipidemia)   • HTN (hypertension)   • Hypothyroid     Past Medical History:   Diagnosis Date   • Arthritis     multi site    • Bronchiectasis (CMS/HCC)     causes SOA   • Dyspnea    • Elevated cholesterol    • History of fall 2018    injured left ankle   • Hypertension    • Hypothyroidism    • Presence of dental bridge     upper    • Suspected sleep apnea     tested three times but ruled out with third test      Past Surgical History:   Procedure Laterality Date   • COLONOSCOPY     • HAND SURGERY Bilateral     reconstruct thumb sockets    • TURP / TRANSURETHRAL INCISION / DRAINAGE PROSTATE            PT ASSESSMENT (last 12 hours)      Physical Therapy Evaluation     Row Name 18 0823          PT Evaluation Time/Intention    Subjective Information no complaints  -LR     Document Type evaluation;discharge evaluation/summary  -LR     Mode of Treatment physical therapy;individual therapy  -LR     Patient Effort good  -LR     Symptoms Noted During/After Treatment none  -LR     Row Name 18 0823          General Information    Patient Profile Reviewed? yes  -LR     Onset of Illness/Injury or Date of Surgery 18  -LR     Referring Physician MD Spencer  -LR     Patient Observations alert;cooperative;agree to therapy  -LR      General Observations of Patient Patient supine in bed upon arrival. L foot splinted, L popliteal nerve catheter.   -LR     Prior Level of Function independent:;all household mobility;community mobility;gait;transfer;bed mobility;ADL's;home management;cooking;cleaning;driving;shopping;using stairs   prior to fall  -LR     Equipment Currently Used at Home raised toilet;grab bar;crutches, axillary;other (see comments);walker, rolling   rolling knee walker, not currently using prior to admission  -LR     Pertinent History of Current Functional Problem Patient presents for surgical management of L trimalleolar fx sustained on8/21/18. Presented to [facility] ED ands was splinted and made NWB. Patient continued to WBAT on fx L ankle despite these instructions and followed up with Dr. Palmer for 2nd opinion.   -LR     Existing Precautions/Restrictions fall;left;non-weight bearing;other (see comments)   L LE NWB, L popliteal nerve catheter  -LR     Equipment Issued to Patient --   none  -LR     Equipment Ordered for Patient --   none  -LR     Risks Reviewed patient:;LOB;nausea/vomiting;dizziness;increased discomfort;lines disloged  -LR     Benefits Reviewed patient:;improve function;increase independence;increase strength;increase balance;decrease pain;decrease risk of DVT;increase knowledge  -LR     Barriers to Rehab medically complex  -LR     Row Name 09/05/18 0823          Relationship/Environment    Primary Source of Support/Comfort spouse   works during the day  -LR     Lives With spouse  -LR     Row Name 09/05/18 0823          Resource/Environmental Concerns    Current Living Arrangements home/apartment/condo  -LR     Resource/Environmental Concerns home accessibility  -LR     Home Accessibility Concerns stairs to enter home;not wheelchair accessible  -LR     Row Name 09/05/18 0823          Home Main Entrance    Number of Stairs, Main Entrance two  -LR     Stair Railings, Main Entrance none  -LR     Row Name 09/05/18 0823           Cognitive Assessment/Intervention- PT/OT    Orientation Status (Cognition) oriented x 4  -LR     Follows Commands (Cognition) WFL;75-90% accuracy;verbal cues/prompting required;repetition of directions required  -LR     Safety Deficit (Cognitive) mild deficit;ability to follow commands;at risk behavior observed;awareness of need for assistance;insight into deficits/self awareness;judgment;safety precautions awareness;safety precautions follow-through/compliance  -LR     Row Name 09/05/18 0823          Safety Issues, Functional Mobility    Safety Issues Affecting Function (Mobility) ability to follow commands;at risk behavior observed;awareness of need for assistance;insight into deficits/self awareness;judgment;positioning of assistive device;safety precaution awareness;safety precautions follow-through/compliance;sequencing abilities  -LR     Row Name 09/05/18 0823          Mobility Assessment/Treatment    Extremity Weight-bearing Status left lower extremity  -LR     Left Lower Extremity (Weight-bearing Status) non weight-bearing (NWB)  -LR     Row Name 09/05/18 0823          Bed Mobility Assessment/Treatment    Bed Mobility Assessment/Treatment supine-sit  -LR     Supine-Sit [place] (Bed Mobility) conditional independence  -LR     Bed Mobility, Safety Issues decreased use of legs for bridging/pushing  -LR     Assistive Device (Bed Mobility) head of bed elevated;bed rails  -LR     Comment (Bed Mobility) Denied dizziness upon sitting up.   -LR     Row Name 09/05/18 0823          Transfer Assessment/Treatment    Transfer Assessment/Treatment sit-stand transfer;stand-sit transfer  -LR     Maintains Weight-bearing Status (Transfers) able to maintain;verbal cues to maintain  -LR     Comment (Transfers) Verbal cues for correct technique with t/f on and off rolling knee walker. Verbal cues for NWB on L.   -LR     Sit-Stand [place] (Transfers) verbal cues;stand by assist  -LR     Stand-Sit [place]  (Transfers) verbal cues;stand by assist  -LR     Row Name 09/05/18 0823          Sit-Stand Transfer    Assistive Device (Sit-Stand Transfers) walker, knee scooter  -LR     Row Name 09/05/18 0823          Stand-Sit Transfer    Assistive Device (Stand-Sit Transfers) walker, knee scooter  -LR     Row Name 09/05/18 0823          Gait/Stairs Assessment/Training    27825 - Gait Training Minutes  10  -LR     [place] Level (Gait) verbal cues;contact guard  -LR     Assistive Device (Gait) walker, knee scooter  -LR     Distance in Feet (Gait) 400  -LR     Pattern (Gait) swing-through  -LR     Maintains Weight-bearing Status (Gait) able to maintain;verbal cues to maintain  -LR     [place] Level (Stairs) verbal cues;minimum assist (75% patient effort);1 person assist  -LR     Assistive Device (Stairs) crutches, axillary  -LR     Handrail Location (Stairs) none  -LR     Number of Steps (Stairs) 1  -LR     Ascending Technique (Stairs) step-to-step  -LR     Descending Technique (Stairs) step-to-step  -LR     Maintains Weight-bearing Status (Stairs) able to maintain;verbal cues to maintain  -LR     Stairs, Safety Issues sequencing ability decreased  -LR     Stairs, Impairments strength decreased;pain  -LR     Comment (Gait/Stairs) Patient ambulated with rolling knee walker with smooth swing through gait, no LOB or unsteadiness. Verbal cues for locking brakes on knee walker when t/f on and off knee walker and correct placement of L knee on rolling knee walker. Verbal cues for correct use of crutches to hop up step. Patient able to hop up one step but then unable to hop up second step, became unsteady. Patient repeatedly reached for handrail instead of using crutches for stair training. Verbal cues for NWB on L with stair training. Cues to use handrails for UE support to hop down off of step. Educated patient to go up step on his bottom or have family build ramp and use w/c to access home.   -LR     Row Name 09/05/18 0823           General ROM    RT Lower Ext Comment  -LR     LT Lower Ext Comment  -LR     Row Name 09/05/18 0823          Right Lower Ext    RT Lower Extremity Comments R LE AROM WFL  -LR     Row Name 09/05/18 0823          Left Lower Ext    LT Lower Extremity Comments L LE AROM WFL except for L ankle AROM impaired 100% d/t splint  -LR     Row Name 09/05/18 0823          MMT (Manual Muscle Testing)    Rt Lower Ext Rt Hip WFL;Rt Knee WFL;Rt Ankle WFL  -LR     Lt Lower Ext Lt Hip WFL;Lt Knee WFL;Comments  -LR     Row Name 09/05/18 0823          MMT Left Lower Ext    Lt Lower Extremity Comments  L ankle 0/5 d/t fixed in splint and popliteal nerve cath  -LR     Row Name 09/05/18 0823          Motor Assessment/Intervention    Additional Documentation Balance (Group)  -     Row Name 09/05/18 0823          Balance    Balance static standing balance  -     Row Name 09/05/18 0823          Static Standing Balance    Level of [place] (Supported Standing, Static Balance) standby assist  -LR     Time Able to Maintain Position (Supported Standing, Static Balance) 30 to 45 seconds  -LR     Assistive Device Utilized (Supported Standing, Static Balance) other (see comments)   rolling knee walker  -LR     Row Name 09/05/18 0823          Sensory Assessment/Intervention    Sensory General Assessment light touch sensation deficits identified  -     Row Name 09/05/18 0823          Vision Assessment/Intervention    Visual Impairment/Limitations WFL  -LR     Row Name 09/05/18 0823          Light Touch Sensation Assessment    Left Lower Extremity: Light Touch Sensation Assessment mild impairment, 75% or more correct responses  -LR     Comment, Left Lower Extremity: Light Touch Sensation Assessment d/t popliteal nerve cath  -LR     Row Name 09/05/18 0823          Pain Assessment    Additional Documentation Pain Scale: Numbers Pre/Post-Treatment (Group)  -LR     Row Name 09/05/18 0823          Pain Scale: Numbers Pre/Post-Treatment     Pain Scale: Numbers, Pretreatment 0/10 - no pain  -LR     Pain Scale: Numbers, Post-Treatment 0/10 - no pain  -LR     Pain Location - Side Left  -LR     Pain Location foot  -LR     Pain Intervention(s) Repositioned;Ambulation/increased activity  -LR     Row Name             Wound 09/04/18 1401 Left leg incision    Wound - Properties Group Date first assessed: 09/04/18  -SV Time first assessed: 1401  -SV Side: Left  -SV Location: leg  -SV Type: incision  -SV    Row Name 09/05/18 0823          Coping    Observed Emotional State accepting;cooperative  -LR     Verbalized Emotional State acceptance  -LR     Row Name 09/05/18 0823          Plan of Care Review    Plan of Care Reviewed With patient  -LR     Row Name 09/05/18 0823          Physical Therapy Clinical Impression    Date of Referral to PT 09/05/18  -LR     PT Diagnosis (PT Clinical Impression) L trimalleolar ankle fx with syndesmotic disruption/ s/p ORIF L trimalleolar fx without fixation of posterior fragment, ORIF syndesmosis  -LR     Prognosis (PT Clinical Impression) good  -LR     Patient/Family Goals Statement (PT Clinical Impression) go home  -LR     Criteria for Skilled Interventions Met (PT Clinical Impression) yes;treatment indicated  -LR     Rehab Potential (PT Clinical Summary) good, to achieve stated therapy goals  -LR     Care Plan Review (PT) evaluation/treatment results reviewed;care plan/treatment goals reviewed;risks/benefits reviewed;current/potential barriers reviewed;patient/other agree to care plan  -LR     Row Name 09/05/18 0823          Physical Therapy Goals    Bed Mobility Goal Selection (PT) bed mobility, PT goal 1  -LR     Transfer Goal Selection (PT) transfer, PT goal 1  -LR     Gait Training Goal Selection (PT) gait training, PT goal 1  -LR     Stairs Goal Selection (PT) stairs, PT goal 1  -LR     Additional Documentation Stairs Goal Selection (PT) (Row)  -LR     Row Name 09/05/18 0823          Bed Mobility Goal 1 (PT)     Activity/Assistive Device (Bed Mobility Goal 1, PT) supine to sit  -LR     [place] Level/Cues Needed (Bed Mobility Goal 1, PT) conditional independence  -LR     Time Frame (Bed Mobility Goal 1, PT) long term goal (LTG);1 day  -LR     Progress/Outcomes (Bed Mobility Goal 1, PT) goal met;discharged from [facility]     Row Name 09/05/18 0823          Transfer Goal 1 (PT)    Activity/Assistive Device (Transfer Goal 1, PT) sit-to-stand/stand-to-sit;other (see comments)   rolling knee walker  -LR     [place] Level/Cues Needed (Transfer Goal 1, PT) standby assist  -LR     Time Frame (Transfer Goal 1, PT) long term goal (LTG);1 day  -LR     Progress/Outcome (Transfer Goal 1, PT) goal met;discharged from [facility]     Row Name 09/05/18 0823          Gait Training Goal 1 (PT)    Activity/Assistive Device (Gait Training Goal 1, PT) gait (walking locomotion);other (see comments)   rolling knee walker  -LR     [place] Level (Gait Training Goal 1, PT) contact guard assist  -LR     Distance (Gait Goal 1, PT) 400 feet  -LR     Time Frame (Gait Training Goal 1, PT) long term goal (LTG);1 day  -LR     Progress/Outcome (Gait Training Goal 1, PT) goal met;discharged from [facility]     Row Name 09/05/18 0823          Stairs Goal 1 (PT)    Activity/Assistive Device (Stairs Goal 1, PT) ascending stairs;descending stairs;step-to-step;maintain weight bearing status;crutches, axillary  -LR     [place] Level/Cues Needed (Stairs Goal 1, PT) contact guard assist  -LR     Number of Stairs (Stairs Goal 1, PT) 2  -LR     Time Frame (Stairs Goal 1, PT) long term goal (LTG);1 day  -LR     Progress/Outcome (Stairs Goal 1, PT) goal not met;discharged from [facility]     Row Name 09/05/18 0823          Positioning and Restraints    Pre-Treatment Position in bed  -LR     Post Treatment Position chair  -LR     In Chair notified nsg;reclined;sitting;call light within reach;encouraged to call for assist;exit alarm on;legs  elevated;LLE elevated  -LR     Row Name 09/05/18 0823          Physical Therapy Discharge Summary    Additional Documentation Discharge Summary, PT Eval (Group)  -LR     Row Name 09/05/18 0823          Discharge Summary, PT Eval    Reason for Discharge (PT Discharge Summary) patient discharged from this facility  -LR     Outcomes Achieved Upon Discharge (PT Discharge Summary) patient able to partially achieve established goals  -LR     Transfer to Another Level of Care or Facility (PT Discharge Summary) patient will continue therapy goals following discharge  -LR     Row Name 09/05/18 0823          Living Environment    Home Accessibility not wheelchair accessible;stairs to enter home;other (see comments)   walk in shower and tub shower  -LR       User Key  (r) = Recorded By, (t) = Taken By, (c) = Cosigned By    Initials Name Provider Type    Lauren Wakefield, PT Physical Therapist    Kayli Davidson, RN Registered Nurse          Physical Therapy Education     Title: PT OT SLP Therapies (Done)     Topic: Physical Therapy (Done)     Point: Mobility training (Done)    Learning Progress Summary     Learner Status Readiness Method Response Comment Documented by    Patient Done Acceptance LEOBARDO BISHOP,RASHMI Educated on importance of maintaining NWB on L LE for proper healing.Educated on correct t/f technique to t/f on and off knee walker,correct use of RW/rolling knee walker for ambulation, going up stairs on his bottom or via w/c, and correct car t/f tech LR 09/05/18 9437          Point: Home exercise program (Done)    Learning Progress Summary     Learner Status Readiness Method Response Comment Documented by    Patient Done Acceptance LEOBARDO BISHOPNR Educated on importance of maintaining NWB on L LE for proper healing.Educated on correct t/f technique to t/f on and off knee walker,correct use of RW/rolling knee walker for ambulation, going up stairs on his bottom or via w/c, and correct car t/f tech LR 09/05/18 9608           Point: Body mechanics (Done)    Learning Progress Summary     Learner Status Readiness Method Response Comment Documented by    Patient Done Acceptance LEOBARDO BISHOP,NR Educated on importance of maintaining NWB on L LE for proper healing.Educated on correct t/f technique to t/f on and off knee walker,correct use of RW/rolling knee walker for ambulation, going up stairs on his bottom or via w/c, and correct car t/f tech LR 09/05/18 0914          Point: Precautions (Done)    Learning Progress Summary     Learner Status Readiness Method Response Comment Documented by    Patient Done Acceptance LEOBARDO BISHOP,NR Educated on importance of maintaining NWB on L LE for proper healing.Educated on correct t/f technique to t/f on and off knee walker,correct use of RW/rolling knee walker for ambulation, going up stairs on his bottom or via w/c, and correct car t/f tech LR 09/05/18 0935                      User Key     Initials Effective Dates Name Provider Type Discipline     06/19/15 -  Lauren Sterling, PT Physical Therapist PT                PT Recommendation and Plan  Anticipated Discharge Disposition (PT): home with assist  Planned Therapy Interventions (PT Eval): balance training, bed mobility training, gait training, home exercise program, patient/family education, stair training, strengthening, transfer training  Therapy Frequency (PT Clinical Impression): evaluation only (d/c home today)  Outcome Summary/Treatment Plan (PT)  Anticipated Equipment Needs at Discharge (PT): bedside commode, shower chair, front wheeled walker  Anticipated Discharge Disposition (PT): home with assist  Reason for Discharge (PT Discharge Summary): patient discharged from this facility  Plan of Care Reviewed With: patient  Progress: improving  Outcome Summary: Patient ambulated 400 feet with rolling knee walker with no difficulty. Attempted stair training with axillary crutches, patient able to climb one step with assist but unable to climb  2nd step to simulate entrance to home. Instructed patient to go up steps on his bottom or have ramp placed and use w/c to access home. Reinforced importance of maintaining NWB on L LE with mobility. Patient has been d/c home with family today.           Outcome Measures     Row Name 09/05/18 0823             How much help from another person do you currently need...    Turning from your back to your side while in flat bed without using bedrails? 4  -LR      Moving from lying on back to sitting on the side of a flat bed without bedrails? 4  -LR      Moving to and from a bed to a chair (including a wheelchair)? 3  -LR      Standing up from a chair using your arms (e.g., wheelchair, bedside chair)? 3  -LR      Climbing 3-5 steps with a railing? 1  -LR      To walk in hospital room? 3  -LR      AM-PAC 6 Clicks Score 18  -LR         Functional Assessment    Outcome Measure Options AM-PAC 6 Clicks Basic Mobility (PT)  -LR        User Key  (r) = Recorded By, (t) = Taken By, (c) = Cosigned By    Initials Name Provider Type    Lauren Wakefield, PT Physical Therapist           Time Calculation:         PT Charges     Row Name 09/05/18 0823             Time Calculation    Start Time 0823  -LR      PT Received On 09/05/18  -LR      PT Goal Re-Cert Due Date 09/15/18  -LR         Time Calculation- PT    Total Timed Code Minutes- PT 10 minute(s)  -LR         Timed Charges    17881 - Gait Training Minutes  10  -LR        User Key  (r) = Recorded By, (t) = Taken By, (c) = Cosigned By    Initials Name Provider Type    Lauren Wakefield, PT Physical Therapist        Therapy Suggested Charges     Code   Minutes Charges    09189 (CPT®) Hc Pt Neuromusc Re Education Ea 15 Min      17630 (CPT®) Hc Pt Ther Proc Ea 15 Min      27402 (CPT®) Hc Gait Training Ea 15 Min 10 1    03516 (CPT®) Hc Pt Therapeutic Act Ea 15 Min      11434 (CPT®) Hc Pt Manual Therapy Ea 15 Min      68684 (CPT®) Hc Pt Iontophoresis Ea 15 Min      06118  (CPT®) Hc Pt Elec Stim Ea-Per 15 Min      23764 (CPT®) Hc Pt Ultrasound Ea 15 Min      05686 (CPT®) Hc Pt Self Care/Mgmt/Train Ea 15 Min      05133 (CPT®) Hc Pt Prosthetic (S) Train Initial Encounter, Each 15 Min      90464 (CPT®) Hc Pt Orthotic(S)/Prosthetic(S) Encounter, Each 15 Min      57515 (CPT®) Hc Orthotic(S) Mgmt/Train Initial Encounter, Each 15min      Total  10 1        Therapy Charges for Today     Code Description Service Date Service Provider Modifiers Qty    50432831929 HC PT MOBILITY CURRENT 9/5/2018 Lauren Sterling, PT GP, CK 1    84812821345 HC PT MOBILITY PROJECTED 9/5/2018 Lauren Sterling, PT GP, CK 1    70303532047 HC PT MOBILITY DISCHARGE 9/5/2018 Laurne Sterling, PT GP, CK 1    75619338400 HC GAIT TRAINING EA 15 MIN 9/5/2018 Lauren Sterling, PT GP 1    50696965745 HC PT THER SUPP EA 15 MIN 9/5/2018 Lauren Sterling, PT GP 1    73374258219 HC PT EVAL LOW COMPLEXITY 4 9/5/2018 Lauren Sterling, PT GP 1          PT G-Codes  PT Professional Judgement Used?: Yes  Outcome Measure Options: AM-PAC 6 Clicks Basic Mobility (PT)  AM-PAC 6 Clicks Score: 18  Functional Limitation: Mobility: Walking and moving around  Mobility: Walking and Moving Around Current Status (): At least 40 percent but less than 60 percent impaired, limited or restricted  Mobility: Walking and Moving Around Goal Status (): At least 40 percent but less than 60 percent impaired, limited or restricted  Mobility: Walking and Moving Around Discharge Status (): At least 40 percent but less than 60 percent impaired, limited or restricted    PT Discharge Summary  Anticipated Discharge Disposition (PT): home with assist  Reason for Discharge: Discharge from facility  Outcomes Achieved: Patient able to partially acheive established goals, Discharge from facility occurred on same date as evluation  Discharge Destination: Home with assist    Lauren Sterling, PT  9/5/2018

## 2018-09-07 NOTE — PROGRESS NOTES
[facility]    Nerve Cath Post Op Call    Patient Name: Antione Crum  :  1940  MRN:  [id number]  Date of Discharge: 2018    Nerve Cath Post Op Call:    Analgesia:Good  Pain Score:0/10  Side Effects:None  Catheter Site:clean  Catheter Plan:Patient/Family member report nerve catheter previously discontinued, tip intact                    Medial Pain

## 2018-09-21 ENCOUNTER — OFFICE VISIT (OUTPATIENT)
Dept: ORTHOPEDIC SURGERY | Facility: CLINIC | Age: 78
End: 2018-09-21

## 2018-09-21 DIAGNOSIS — E11.40 CONTROLLED TYPE 2 DIABETES MELLITUS WITH DIABETIC NEUROPATHY, WITHOUT LONG-TERM CURRENT USE OF INSULIN (HCC): ICD-10-CM

## 2018-09-21 DIAGNOSIS — Z47.89 AFTERCARE FOLLOWING SURGERY OF THE MUSCULOSKELETAL SYSTEM: Primary | ICD-10-CM

## 2018-09-21 DIAGNOSIS — G62.9 NEUROPATHY: ICD-10-CM

## 2018-09-21 DIAGNOSIS — I87.8 VENOUS STASIS: ICD-10-CM

## 2018-09-21 PROCEDURE — 99024 POSTOP FOLLOW-UP VISIT: CPT | Performed by: ORTHOPAEDIC SURGERY

## 2018-09-21 NOTE — PROGRESS NOTES
Post-op (2 weeks s/p OPEN REDUCTION INTERNAL FIXATION LEFT TRIMALL ANKLE FRACTURE 9/4/18)      Antione Crum is 3 weeks status post ORIF left trimal ankle fracture, with syndesmotic fixation, 9/4/18. He reports no fever, chills.  He reports pain is well controlled.  They have been taking lovenox for DVT prophylaxis.  They have been NWB in splint.      Past Surgical History:   Procedure Laterality Date   • ANKLE OPEN REDUCTION INTERNAL FIXATION Left 9/4/2018    Procedure: OPEN REDUCTION INTERNAL FIXATION LEFT TRIMALL ANKLE FRACTURE  ;  Surgeon: Cheryl Palmer MD;  Location: [facility];  Service: Orthopedics   • COLONOSCOPY     • HAND SURGERY Bilateral     reconstruct thumb sockets    • TURP / TRANSURETHRAL INCISION / DRAINAGE PROSTATE  2015       There were no vitals taken for this visit.        No erythema, no drainage, no sign of infection, normal post op swelling. Moderate swelling left leg, n-v unchanged    ordered and reviewed x-rays today    Assessment and Plan:   1. Aftercare following surgery of the musculoskeletal system  Stable s/p ORIF complex ankle fracture in the face of probable diabetic neuropathy, and definite severe venous stasis.  He walked on it pre-op, minimal pain.  It was very difficult to fix.  He has not walked on it post op, has been fairly compliant with elevation- needs to continue.  We removed sutures and put him in non-wt bearing short leg fiberglass cast.  I EMPHASIZED NONWT BEaRING.  I will see him in 6 weeks for 3 views of ankle out of cast, non-wt bearing.  He also needs to maintain good glucose control  - XR Ankle 3+ View Left

## 2018-11-05 ENCOUNTER — OFFICE VISIT (OUTPATIENT)
Dept: ORTHOPEDIC SURGERY | Facility: CLINIC | Age: 78
End: 2018-11-05

## 2018-11-05 DIAGNOSIS — S82.852D CLOSED TRIMALLEOLAR FRACTURE OF LEFT ANKLE WITH ROUTINE HEALING, SUBSEQUENT ENCOUNTER: ICD-10-CM

## 2018-11-05 DIAGNOSIS — Z09 FOLLOW UP: Primary | ICD-10-CM

## 2018-11-05 DIAGNOSIS — G62.9 NEUROPATHY: ICD-10-CM

## 2018-11-05 DIAGNOSIS — E11.40 CONTROLLED TYPE 2 DIABETES MELLITUS WITH DIABETIC NEUROPATHY, WITHOUT LONG-TERM CURRENT USE OF INSULIN (HCC): ICD-10-CM

## 2018-11-05 DIAGNOSIS — I87.8 VENOUS STASIS: ICD-10-CM

## 2018-11-05 PROCEDURE — 99024 POSTOP FOLLOW-UP VISIT: CPT | Performed by: ORTHOPAEDIC SURGERY

## 2018-11-05 PROCEDURE — 99212 OFFICE O/P EST SF 10 MIN: CPT | Performed by: ORTHOPAEDIC SURGERY

## 2018-11-05 NOTE — PROGRESS NOTES
Follow-up of the Left Ankle (Follow up - OPEN REDUCTION INTERNAL FIXATION LEFT TRIMALL ANKLE FRACTURE ( 9-4-18))      Antione Crum is 8 weeks status post ORIF left trimalleolar ankle fracture, 9/4/18, neuropathy. He reports no fever, chills.  He reports pain is well controlled.  They have been taking aspirin for DVT prophylaxis.  They have been NWB in cast.  He reports he has tried to stay off the foot, but the bottom of his cast is completely broken down.  I once again cautioned him about the risks of walking on this, it can fall apart, it can develop a Charcot joint, and he can end up with an amputation.       In addition to the surgical follow-up, I have made recommendations regarding treatment of his severe venous stasis.  he is supposed to be wearing a support stocking on the right leg every day.  He does not have the stocking on.  He reports that he wears them at night.  However he does not wear them when he wears a shoe.  I explained that wearing them at night does absolutely nothing.  It's imperative that he wear them every day.  He should put them on the morning and take them off at night.    Past Surgical History:   Procedure Laterality Date   • ANKLE OPEN REDUCTION INTERNAL FIXATION Left 9/4/2018    Procedure: OPEN REDUCTION INTERNAL FIXATION LEFT TRIMALL ANKLE FRACTURE  ;  Surgeon: Cheryl Palmer MD;  Location: [facility];  Service: Orthopedics   • COLONOSCOPY     • HAND SURGERY Bilateral     reconstruct thumb sockets    • TURP / TRANSURETHRAL INCISION / DRAINAGE PROSTATE  2015       There were no vitals taken for this visit.        No erythema, no drainage, no sign of infection, normal post op swelling.  Ankle incisions on the left are healed.  On the right he has 2+ pitting edema and severe venous stasis changes, left is moderate venous stasis changes    ordered and reviewed x-rays today    Assessment and Plan:   1. Follow up Closed trimalleolar fracture of left ankle with routine healing,  subsequent encounter  He is putting too much weight on the left foot.  I again cautioned him very firmly about the risks.  He can end up with an amputation.  I'm going to let him go into a boot, but he must stay absolutely nonweightbearing.  I want him to wear a support stocking under the boot during the daytime.  I will see him in 6 weeks with 3 views of the ankle nonweightbearing      2. Neuropathy  This significantly complicates his treatment, high risk for Charcot and amputation    3.. Venous stasis  Needs to be wearing the support stockings daily, not at night    4. Controlled type 2 diabetes mellitus with diabetic neuropathy, without long-term current use of insulin (CMS/formerly [facility])  This significantly complicates his treatment, high risk for Charcot and amputation

## 2018-12-17 ENCOUNTER — OFFICE VISIT (OUTPATIENT)
Dept: ORTHOPEDIC SURGERY | Facility: CLINIC | Age: 78
End: 2018-12-17

## 2018-12-17 VITALS — BODY MASS INDEX: 36.22 KG/M2 | HEIGHT: 70 IN | WEIGHT: 253 LBS | HEART RATE: 107 BPM | OXYGEN SATURATION: 98 %

## 2018-12-17 DIAGNOSIS — Z09 SURGERY FOLLOW-UP: Primary | ICD-10-CM

## 2018-12-17 DIAGNOSIS — G62.9 NEUROPATHY: ICD-10-CM

## 2018-12-17 DIAGNOSIS — S82.852D CLOSED TRIMALLEOLAR FRACTURE OF LEFT ANKLE WITH ROUTINE HEALING, SUBSEQUENT ENCOUNTER: ICD-10-CM

## 2018-12-17 DIAGNOSIS — I87.8 VENOUS STASIS: ICD-10-CM

## 2018-12-17 PROCEDURE — 99213 OFFICE O/P EST LOW 20 MIN: CPT | Performed by: ORTHOPAEDIC SURGERY

## 2018-12-17 NOTE — PROGRESS NOTES
"ESTABLISHED PATIENT    Patient: Antione Crum  : 1940    Primary Care Provider: Michael Kidd MD    Requesting Provider: As above    Follow-up (6 weeks -   S/P   OPEN REDUCTION INTERNAL FIXATION LEFT TRIMALL ANKLE FRACTURE   18)      History    Chief Complaint: Left ankle fracture    History of Present Illness: He is status post ORIF left trimalleolar ankle fracture with syndesmotic fixation 18.  He has diabetic neuropathy and severe venous stasis.  He is supposed to be nonweightbearing but he is walking in the boot, using a cane.  He reports that he has \"been 95%\" nonweightbearing.  I explained that the cane does not really constitute nonweightbearing- even with a cane he is full weight bearing.  He asked me why leg was still swollen.  He has significant swelling and venous stasis in both legs, I explained that the trauma on the left side we'll make this worse, he will probably permanently have some more swelling left greater than right.  That's why he needs to wear support stockings every day.  He already has significant venous stasis pigment.  At last visit he had been wearing the support stockings at night instead of during the day, I explained that they are only effective during the day.    Current Outpatient Medications on File Prior to Visit   Medication Sig Dispense Refill   • aspirin 325 MG tablet Take 1 tablet by mouth Daily. Resume in 2 weeks     • atorvastatin (LIPITOR) 20 MG tablet Take 20 mg by mouth Daily.     • carvedilol (COREG) 12.5 MG tablet Take 12.5 mg by mouth 2 (Two) Times a Day With Meals.     • Cholecalciferol (VITAMIN D) 2000 units tablet Take 2,000 Units by mouth Daily.     • enoxaparin (LOVENOX) 40 MG/0.4ML solution syringe Inject 0.4 mL under the skin into the appropriate area as directed Daily. For 2 weeks 5.6 mL 0   • LEVOTHYROXINE SODIUM PO Take  by mouth Daily. Pt unsure of dosage     • losartan-hydrochlorothiazide (HYZAAR) 100-25 MG per tablet Take 1 tablet by mouth " Daily.     • Multiple Vitamins-Minerals (MULTIVITAMIN ADULT PO) Take 1 tablet by mouth Daily.     • vitamin C (ASCORBIC ACID) 500 MG tablet Take 500 mg by mouth Daily.       No current facility-administered medications on file prior to visit.       Allergies   Allergen Reactions   • Codeine GI Intolerance      Past Medical History:   Diagnosis Date   • Arthritis     multi site    • Bronchiectasis (CMS/HCC)     causes SOA   • Dyspnea    • Elevated cholesterol    • History of fall 08/21/2018    injured left ankle   • Hypertension    • Hypothyroidism    • Presence of dental bridge     upper    • Suspected sleep apnea     tested three times but ruled out with third test      Past Surgical History:   Procedure Laterality Date   • COLONOSCOPY     • HAND SURGERY Bilateral     reconstruct thumb sockets    • TURP / TRANSURETHRAL INCISION / DRAINAGE PROSTATE  2015     Family History   Problem Relation Age of Onset   • Cancer Mother    • Hypertension Mother    • Cancer Father       Social History     Socioeconomic History   • Marital status:      Spouse name: Not on file   • Number of children: Not on file   • Years of education: Not on file   • Highest education level: Not on file   Social Needs   • Financial resource strain: Not on file   • Food insecurity - worry: Not on file   • Food insecurity - inability: Not on file   • Transportation needs - medical: Not on file   • Transportation needs - non-medical: Not on file   Occupational History   • Not on file   Tobacco Use   • Smoking status: Never Smoker   • Smokeless tobacco: Never Used   Substance and Sexual Activity   • Alcohol use: Yes     Comment: 3-4 drinks per week    • Drug use: No   • Sexual activity: Yes     Partners: Female     Comment:    Other Topics Concern   • Not on file   Social History Narrative   • Not on file        Review of Systems   Constitutional: Negative.    HENT: Negative.    Eyes: Negative.    Respiratory: Negative.    Cardiovascular:  "Negative.    Gastrointestinal: Negative.    Endocrine: Negative.    Genitourinary: Negative.    Musculoskeletal: Positive for arthralgias and joint swelling.   Skin: Negative.    Allergic/Immunologic: Negative.    Neurological: Negative.    Hematological: Negative.    Psychiatric/Behavioral: Negative.        The following portions of the patient's history were reviewed and updated as appropriate: allergies, current medications, past family history, past medical history, past social history, past surgical history and problem list.    Physical Exam:   Pulse 107   Ht 177.8 cm (70\")   Wt 115 kg (253 lb)   SpO2 98%   BMI 36.30 kg/m²   GENERAL: Body habitus: obese    Lower extremity edema: Left: 3+ pitting; Right: 3+ pitting    Gait: using cane     Mental Status:  awake and alert; oriented to person, place, and time  MSK:  Tibia:  Right:  non tender; Left:  non tender        Ankle:  Right: non tender; Left:  No tenderness in the left ankle, dorsiflexion is about 10°, plantarflexion 30, 5° inversion eversion, incisions are healed with a few small superficial scabs on the lateral incision, no signs of infection, no tenderness        Foot:  Right:  non tender; Left:  non tender    NEURO Sensation:  globally diminished,       Medical Decision Making    Data Review:   ordered and reviewed x-rays today    Assessment/Plan/Diagnosis/Treatment Options:   1.Closed trimalleolar fracture of left ankle with routine healing, subsequent encounter  Despite being completely noncompliant with nonweightbearing over the past, the fracture is still healing, no signs of Charcot breakdown.  I again reminded him that it is not fully healed, he is at high risk for complications.  His fracture was very difficult to fix because the ambulation had impacted it.  He still could end up with a devastating complication such as amputation.  I will now officially allow him to walk in the boot, he may do physical therapy, they may wean him out of the " "boot if he is comfortable.  Again he needs to wear support stockings during the day every day.  I will see him in 6 weeks for x-ray of the ankle 2 view standing      2.  Controlled diabetes: He asked me if there was any other doctor to see about his diabetes, I explained that endocrinologist also treat diabetes.  He reports he is \"only taking 2 small pills\" and \"they don't seem to do much\".  I would recommend he discuss this with his internist.      3. Neuropathy  Dense neuropathy no change    4. Venous stasis  As above he needs to wear support stockings                            "

## 2019-01-28 ENCOUNTER — OFFICE VISIT (OUTPATIENT)
Dept: ORTHOPEDIC SURGERY | Facility: CLINIC | Age: 79
End: 2019-01-28

## 2019-01-28 DIAGNOSIS — I87.8 VENOUS STASIS: ICD-10-CM

## 2019-01-28 DIAGNOSIS — G62.9 NEUROPATHY: ICD-10-CM

## 2019-01-28 DIAGNOSIS — Z09 SURGERY FOLLOW-UP: Primary | ICD-10-CM

## 2019-01-28 PROCEDURE — 99212 OFFICE O/P EST SF 10 MIN: CPT | Performed by: ORTHOPAEDIC SURGERY

## 2019-01-28 NOTE — PROGRESS NOTES
Follow-up (5 months s/p OPEN REDUCTION INTERNAL FIXATION LEFT TRIMALL ANKLE FRACTURE   9-4-18)      Antione Crum is 4 months status post ORIF left trimall ankle fracture with syndesmotic fixation, 9/4/18. He reports no fever, chills. He has been to PT and ROM is very good.  He notes less pain and better function weekly.  He is using a cane for balance.  He reports he is wearing support stockings daily.  (not on today due to office visit)  He asked if the leg would be a little shorter because of the fracture impaction (he walked on it pre op) I think it will be minimal, 1/4 inch possibly.  Io was able to dis-impact the fracture and get it out to length, X-rays today do not show significant impaction.  Using the PSIS to compare side to side I do not see a significant leg length inequality.  He asked if he will have a limp- probably not, depends on function    Past Surgical History:   Procedure Laterality Date   • ANKLE OPEN REDUCTION INTERNAL FIXATION Left 9/4/2018    Procedure: OPEN REDUCTION INTERNAL FIXATION LEFT TRIMALL ANKLE FRACTURE  ;  Surgeon: Cheryl Palmer MD;  Location: [facility];  Service: Orthopedics   • COLONOSCOPY     • HAND SURGERY Bilateral     reconstruct thumb sockets    • TURP / TRANSURETHRAL INCISION / DRAINAGE PROSTATE  2015       There were no vitals taken for this visit.        No erythema, no drainage, no sign of infection, normal post op swelling. Left ankle incisions healed, no change indense neuropathy, no venous ulcers.  No change in significant venous stasis bilaterally.  Left ankle ROm is 10 deg dorsiflex, 40 plantar, 8 deg inv and ev, symmetric with right    ordered and reviewed x-rays today    Assessment and Plan:   1. Surgery follow-up  Doing well with very difficult problem.  Very good ROM.  Must continue to wear support stockings daily.  I will see him in 6 months fro standing ankle xray  - XR Ankle 2 View Left    2. Neuropathy- dense, no change, needs to do good diabetic foot  care    3. Venous stasis- severe, needs to wear the support stockings to prevent it from getting worse, high risk for ulcerations

## 2019-07-29 ENCOUNTER — OFFICE VISIT (OUTPATIENT)
Dept: ORTHOPEDIC SURGERY | Facility: CLINIC | Age: 79
End: 2019-07-29

## 2019-07-29 VITALS — BODY MASS INDEX: 33.83 KG/M2 | HEIGHT: 70 IN | HEART RATE: 92 BPM | WEIGHT: 236.33 LBS | OXYGEN SATURATION: 95 %

## 2019-07-29 DIAGNOSIS — I87.8 VENOUS STASIS: ICD-10-CM

## 2019-07-29 DIAGNOSIS — Z09 SURGERY FOLLOW-UP: Primary | ICD-10-CM

## 2019-07-29 DIAGNOSIS — E11.40 CONTROLLED TYPE 2 DIABETES MELLITUS WITH DIABETIC NEUROPATHY, WITHOUT LONG-TERM CURRENT USE OF INSULIN (HCC): ICD-10-CM

## 2019-07-29 DIAGNOSIS — S82.852D CLOSED TRIMALLEOLAR FRACTURE OF LEFT ANKLE WITH ROUTINE HEALING, SUBSEQUENT ENCOUNTER: ICD-10-CM

## 2019-07-29 PROCEDURE — 99213 OFFICE O/P EST LOW 20 MIN: CPT | Performed by: ORTHOPAEDIC SURGERY

## 2019-07-29 NOTE — PROGRESS NOTES
ESTABLISHED PATIENT    Patient: Antione Crum  : 1940    Primary Care Provider: Michael Kidd MD    Requesting Provider: As above    Follow-up (10 months OPEN REDUCTION INTERNAL FIXATION LEFT TRIMALL ANKLE FRACTURE   18))      History    Chief Complaint: Left ankle fracture    History of Present Illness: He returns almost 1 year status post ORIF complex left ankle fracture.  He had a impaction from walking on it.  He has dense neuropathy.  He also has severe venous stasis.  He should be wearing support stockings but is not.  He reports that the ankle feels a little stiff but overall he does not have significant pain.    Current Outpatient Medications on File Prior to Visit   Medication Sig Dispense Refill   • aspirin 325 MG tablet Take 1 tablet by mouth Daily. Resume in 2 weeks     • atorvastatin (LIPITOR) 20 MG tablet Take 20 mg by mouth Daily.     • carvedilol (COREG) 12.5 MG tablet Take 12.5 mg by mouth 2 (Two) Times a Day With Meals.     • Cholecalciferol (VITAMIN D) 2000 units tablet Take 2,000 Units by mouth Daily.     • LEVOTHYROXINE SODIUM PO Take  by mouth Daily. Pt unsure of dosage     • losartan-hydrochlorothiazide (HYZAAR) 100-25 MG per tablet Take 1 tablet by mouth Daily.     • Multiple Vitamins-Minerals (MULTIVITAMIN ADULT PO) Take 1 tablet by mouth Daily.     • vitamin C (ASCORBIC ACID) 500 MG tablet Take 500 mg by mouth Daily.       No current facility-administered medications on file prior to visit.       Allergies   Allergen Reactions   • Codeine GI Intolerance      Past Medical History:   Diagnosis Date   • Arthritis     multi site    • Bronchiectasis (CMS/HCC)     causes SOA   • Dyspnea    • Elevated cholesterol    • History of fall 2018    injured left ankle   • Hypertension    • Hypothyroidism    • Presence of dental bridge     upper    • Suspected sleep apnea     tested three times but ruled out with third test      Past Surgical History:   Procedure Laterality Date   • ANKLE  "OPEN REDUCTION INTERNAL FIXATION Left 9/4/2018    Procedure: OPEN REDUCTION INTERNAL FIXATION LEFT TRIMALL ANKLE FRACTURE  ;  Surgeon: Cheryl Palmer MD;  Location: [facility];  Service: Orthopedics   • COLONOSCOPY     • HAND SURGERY Bilateral     reconstruct thumb sockets    • TURP / TRANSURETHRAL INCISION / DRAINAGE PROSTATE  2015     Family History   Problem Relation Age of Onset   • Cancer Mother    • Hypertension Mother    • Cancer Father       Social History     Socioeconomic History   • Marital status:      Spouse name: Not on file   • Number of children: Not on file   • Years of education: Not on file   • Highest education level: Not on file   Tobacco Use   • Smoking status: Never Smoker   • Smokeless tobacco: Never Used   Substance and Sexual Activity   • Alcohol use: Yes     Comment: 3-4 drinks per week    • Drug use: No   • Sexual activity: Yes     Partners: Female     Comment:         Review of Systems   Constitutional: Negative.    HENT: Negative.    Eyes: Negative.    Respiratory: Negative.    Cardiovascular: Negative.    Gastrointestinal: Negative.    Endocrine: Negative.    Genitourinary: Negative.    Musculoskeletal: Positive for joint swelling.   Skin: Negative.    Allergic/Immunologic: Negative.    Neurological: Negative.    Hematological: Negative.    Psychiatric/Behavioral: Negative.        The following portions of the patient's history were reviewed and updated as appropriate: allergies, current medications, past family history, past medical history, past social history, past surgical history and problem list.    Physical Exam:   Pulse 92   Ht 177.8 cm (70\")   Wt 107 kg (236 lb 5.3 oz)   SpO2 95%   BMI 33.91 kg/m²   GENERAL: Body habitus: morbidly obese    Lower extremity edema: Left: 3+ pitting; Right: 3+ pitting    Gait: broad based     Mental Status:  awake and alert; oriented to person, place, and time  MSK:  Tibia:  Right:  Nontender, severe venous stasis, severe " pigment change, shiny atrophic skin; Left:  Nontender, severe venous stasis, shiny atrophic skin        Ankle:  Right: non tender; Left:   No tenderness, incisions are healed, ankle range of motion is symmetric with the right, 10 degrees dorsiflexion 30 degrees plantarflexion, 10 degrees inversion eversion        Foot:  Right: Nontender, no calluses no ulcers no pre-ulcerous lesions bilaterally; Left:  non tender    NEURO Sensation:  globally diminished,       Medical Decision Making    Data Review:   ordered and reviewed x-rays today    Assessment/Plan/Diagnosis/Treatment Options:   1. Closed trimalleolar fracture of left ankle with routine healing, subsequent encounter  He has gone on to heal despite all of his problems and noncompliance.  Its fairly amazing that he has not had Charcot degeneration.  The fractures have healed, and the ankle alignment is anatomic.  I will see him as needed    2. Venous stasis  As I explained to him previously he absolutely should be wearing support stockings but is not, he is high risk for wound complications and amputation    3. Controlled type 2 diabetes mellitus with diabetic neuropathy, without long-term current use of insulin (CMS/[facility])  Dense neuropathy, we discussed diabetic foot care again

## 2020-08-17 ENCOUNTER — OFFICE VISIT (OUTPATIENT)
Dept: ORTHOPEDIC SURGERY | Facility: CLINIC | Age: 80
End: 2020-08-17

## 2020-08-17 VITALS — BODY MASS INDEX: 33.93 KG/M2 | OXYGEN SATURATION: 93 % | WEIGHT: 237 LBS | HEIGHT: 70 IN | HEART RATE: 97 BPM

## 2020-08-17 DIAGNOSIS — Z09 SURGERY FOLLOW-UP: Primary | ICD-10-CM

## 2020-08-17 DIAGNOSIS — G62.9 NEUROPATHY: ICD-10-CM

## 2020-08-17 DIAGNOSIS — I87.8 VENOUS STASIS: ICD-10-CM

## 2020-08-17 DIAGNOSIS — E11.40 CONTROLLED TYPE 2 DIABETES MELLITUS WITH DIABETIC NEUROPATHY, WITHOUT LONG-TERM CURRENT USE OF INSULIN (HCC): ICD-10-CM

## 2020-08-17 PROCEDURE — 99213 OFFICE O/P EST LOW 20 MIN: CPT | Performed by: ORTHOPAEDIC SURGERY

## 2020-08-17 NOTE — PROGRESS NOTES
ESTABLISHED PATIENT    Patient: Antione Crum  : 1940    Primary Care Provider: Michael Kidd MD    Requesting Provider: As above    Follow-up (1 year f/u; 2 years s/p ORIF Left ankle 18)      History    Chief Complaint: Left ankle injury    History of Present Illness: He is here for follow-up of his left trimalleolar ankle fracture, in the face of dense neuropathy, diabetes with neuropathy, and extremely severe venous stasis.  His neuropathy is so dense that he walked on the fracture preoperatively and actually impacted it.  He has very severe venous stasis, his legs are like tree bark, he should be wearing support stockings but I have never seen him wear them.  He reports that he does, but the tan lines on his legs do not suggest that he wears them at all.  He certainly has never had them on his right uninjured leg.  He notes that he has intermittent pain in the left ankle, I explained this is consistent with some mild posttraumatic arthritis and the severe nature of the injury.  He asked me about pain medication I would not recommend narcotics.  I would recommend that when it throbs and aches he should lay down and put his feet above his heart and use ice or heat.  I would recommend over-the-counter topical Voltaren gel.  He reports he has had significant difficulty recently with congestive heart failure.  We talked about ways to control swelling, 30 minutes midmorning and midafternoon can certainly help if he has his feet above his heart.  Again wearing compression socks daily is important.  We also discussed weight loss which would help.    Current Outpatient Medications on File Prior to Visit   Medication Sig Dispense Refill   • atorvastatin (LIPITOR) 20 MG tablet Take 20 mg by mouth Daily.     • carvedilol (COREG) 12.5 MG tablet Take 12.5 mg by mouth 2 (Two) Times a Day With Meals.     • Cholecalciferol (VITAMIN D) 2000 units tablet Take 2,000 Units by mouth Daily.     • LEVOTHYROXINE SODIUM PO  Take  by mouth Daily. Pt unsure of dosage     • losartan-hydrochlorothiazide (HYZAAR) 100-25 MG per tablet Take 1 tablet by mouth Daily.     • Multiple Vitamins-Minerals (MULTIVITAMIN ADULT PO) Take 1 tablet by mouth Daily.     • vitamin C (ASCORBIC ACID) 500 MG tablet Take 500 mg by mouth Daily.     • [DISCONTINUED] aspirin 325 MG tablet Take 1 tablet by mouth Daily. Resume in 2 weeks       No current facility-administered medications on file prior to visit.       Allergies   Allergen Reactions   • Codeine GI Intolerance      Past Medical History:   Diagnosis Date   • Arthritis     multi site    • Bronchiectasis (CMS/HCC)     causes SOA   • Dyspnea    • Elevated cholesterol    • History of fall 08/21/2018    injured left ankle   • Hypertension    • Hypothyroidism    • Presence of dental bridge     upper    • Suspected sleep apnea     tested three times but ruled out with third test      Past Surgical History:   Procedure Laterality Date   • ANKLE OPEN REDUCTION INTERNAL FIXATION Left 9/4/2018    Procedure: OPEN REDUCTION INTERNAL FIXATION LEFT TRIMALL ANKLE FRACTURE  ;  Surgeon: Cheryl Palmer MD;  Location: [facility];  Service: Orthopedics   • COLONOSCOPY     • HAND SURGERY Bilateral     reconstruct thumb sockets    • TURP / TRANSURETHRAL INCISION / DRAINAGE PROSTATE  2015     Family History   Problem Relation Age of Onset   • Cancer Mother    • Hypertension Mother    • Cancer Father       Social History     Socioeconomic History   • Marital status:      Spouse name: Not on file   • Number of children: Not on file   • Years of education: Not on file   • Highest education level: Not on file   Tobacco Use   • Smoking status: Never Smoker   • Smokeless tobacco: Never Used   Substance and Sexual Activity   • Alcohol use: Yes     Comment: 3-4 drinks per week    • Drug use: No   • Sexual activity: Yes     Partners: Female     Comment:         Review of Systems   Constitutional: Positive for fatigue.  "  HENT: Negative.    Eyes: Negative.    Respiratory: Positive for shortness of breath.    Cardiovascular: Positive for leg swelling.   Gastrointestinal: Negative.    Endocrine: Negative.    Genitourinary: Negative.    Musculoskeletal: Positive for arthralgias and joint swelling.   Skin: Negative.    Allergic/Immunologic: Positive for environmental allergies.   Neurological: Negative.    Hematological: Negative.    Psychiatric/Behavioral: Negative.        The following portions of the patient's history were reviewed and updated as appropriate: allergies, current medications, past family history, past medical history, past social history, past surgical history and problem list.    Physical Exam:   Pulse 97   Ht 177.9 cm (70.03\")   Wt 108 kg (237 lb)   SpO2 93%   BMI 33.98 kg/m²   GENERAL: Body habitus: morbidly obese    Lower extremity edema: Left: 3+ pitting; Right: 3+ pitting, very severe tree bark-like venous stasis edema bilaterally, no open ulcerations    Gait: broad based, a little short of breath getting on and off the exam table     Mental Status:  awake and alert; oriented to person, place, and time  MSK:  Tibia:  Right:  non tender; Left:  non tender        Ankle:  Right: non tender, ROM  normal and symmetric and motor function  normal; Left:  No tenderness in the left ankle, the old incisions are healed, surprisingly he has normal symmetric range of motion compared with the right, no signs of a Charcot joint, no tenderness today, no more swelling on the left compared with the right        Foot:  Right:  non tender; Left:  non tender    NEURO Sensation:  absent    Medical Decision Making    Data Review:   ordered and reviewed x-rays today    Assessment/Plan/Diagnosis/Treatment Options:   1. Surgery follow-up  His x-rays show only mild posttraumatic arthritis.  It is actually quite remarkable that he has healed as well as he did, and that he avoided any of the severe complications for which he is high " risk.  His incisions healed, the fractures healed, no signs of a Charcot joint.  This is despite the fact that he had significant difficulty with being compliant with nonweightbearing, as well as wearing compression stockings etc.  He has done remarkably well given all of the high risk problems.  I will see him as needed, I explained the intermittent occasional pain due to arthritis is normal.  He probably will get some more arthritis over time.  - XR Ankle 3+ View Left    2. Neuropathy  No change in the very dense neuropathy    3. Venous stasis  No change in the extremely severe venous stasis    4. Controlled type 2 diabetes mellitus with diabetic neuropathy, without long-term current use of insulin (CMS/[facility])  As above

## 2020-10-07 ENCOUNTER — HOSPITAL ENCOUNTER (OUTPATIENT)
Dept: PHYSICAL THERAPY | Facility: HOSPITAL | Age: 80
Setting detail: THERAPIES SERIES
Discharge: HOME | End: 2020-10-07

## 2020-10-07 DIAGNOSIS — S81.802D OPEN WOUND OF LEFT LOWER LEG, SUBSEQUENT ENCOUNTER: ICD-10-CM

## 2020-10-07 DIAGNOSIS — I87.8 VENOUS STASIS: Primary | ICD-10-CM

## 2020-10-07 PROCEDURE — 29581 APPL MULTLAYER CMPRN SYS LEG: CPT

## 2020-10-07 PROCEDURE — 97162 PT EVAL MOD COMPLEX 30 MIN: CPT

## 2020-10-07 NOTE — THERAPY EVALUATION
Outpatient Rehabilitation - Wound/Debridement Initial Eval  [facility]     Patient Name: Antione Crum  : 1940  MRN: [id number]  Today's Date: 10/7/2020                  Admit Date: 10/7/2020    Visit Dx:    ICD-10-CM ICD-9-CM   1. Venous stasis  I87.8 459.81   2. Open wound of left lower leg, subsequent encounter  S81.802D V58.89     891.0       Patient Active Problem List   Diagnosis   • Acute left ankle pain   • Venous stasis   • Neuropathy   • Prediabetes   • Closed trimalleolar fracture of left ankle   • S/P ORIF left trimalleolar fracture   • HLD (hyperlipidemia)   • HTN (hypertension)   • Hypothyroid   • Leukocytosis, likely reactive   • Acute postoperative pain   • Controlled type 2 diabetes mellitus with diabetic neuropathy, without long-term current use of insulin (CMS/HCC)        Past Medical History:   Diagnosis Date   • Arthritis     multi site    • Bronchiectasis (CMS/HCC)     causes SOA   • Dyspnea    • Elevated cholesterol    • History of fall 2018    injured left ankle   • Hypertension    • Hypothyroidism    • Presence of dental bridge     upper    • Suspected sleep apnea     tested three times but ruled out with third test         Past Surgical History:   Procedure Laterality Date   • ANKLE OPEN REDUCTION INTERNAL FIXATION Left 2018    Procedure: OPEN REDUCTION INTERNAL FIXATION LEFT TRIMALL ANKLE FRACTURE  ;  Surgeon: Cheryl Palmer MD;  Location: [facility];  Service: Orthopedics   • COLONOSCOPY     • HAND SURGERY Bilateral     reconstruct thumb sockets    • TURP / TRANSURETHRAL INCISION / DRAINAGE PROSTATE         Patient History     Row Name 10/07/20 1430             History    Chief Complaint  Ulcer, wound or other skin conditions  -MF      Date Current Problem(s) Began  20 ~ 6weeks ago per pt  -MF      Brief Description of Current Complaint  Pt developed increased LE edema and scabs / blisters to LLE.  Pt attempted to cover wounds at home with neosporin and  gauze with no improvement noted.  Pt now referred to OPPT wound care for management of LE ulcers.   -MF      Patient/Caregiver Goals  Heal wound  -      Patient's Rating of General Health  Fair  -         Pain     Pain Location  Leg  -      Pain at Present  0  -MF      Pain at Best  0  -MF      Pain at Worst  2;3  -MF      Choi-Cabezas FACES Pain Rating   0  -MF         Daily Activities    Primary Language  English  -      Pt Participated in POC and Goals  Yes  -MF        User Key  (r) = Recorded By, (t) = Taken By, (c) = Cosigned By    Initials Name Provider Type     Eliazar Schrader, PT Physical Therapist          EVALUATION    LDA Wound     Row Name 10/07/20 1500             Wound 10/07/20 1430 Left upper leg Venous Ulcer    Wound - Properties Group Placement Date: 10/07/20  - Placement Time: 1430 - Side: Left  - Orientation: upper  - Location: leg  -MF Primary Wound Type: Venous ulcer  -MF    Wound Image  Images linked: 1  -MF      Dressing Appearance  intact  -MF      Base  moist;pink;red;slough;yellow  -MF      Periwound  intact;dry  -      Periwound Temperature  warm  -      Periwound Skin Turgor  soft  -MF      Edges  open  -      Wound Length (cm)  1.2 cm  -MF      Wound Width (cm)  0.9 cm  -MF      Wound Depth (cm)  0.2 cm  -MF      Drainage Characteristics/Odor  serosanguineous  -      Drainage Amount  scant  -MF      Care, Wound  irrigated with;sterile normal saline  -      Dressing Care  foam;low-adherent;multi-layer wrap therahoney with mepilex Ag foam and kerlix to secure  -      Periwound Care  cleansed with pH balanced cleanser  -      Retired Wound - Properties Group Date first assessed: 10/07/20  - Time first assessed: 1430  -MF Side: Left  - Location: leg  - Primary Wound Type: Venous ulcer  -MF       Wound 10/07/20 1430 Left lower leg Venous Ulcer    Wound - Properties Group Placement Date: 10/07/20  - Placement Time: 1430  - Side: Left  -  Orientation: lower  -MF Location: leg  -MF Primary Wound Type: Venous ulcer  -MF    Dressing Appearance  intact  -MF      Base  moist;pink;red;slough;yellow  -MF      Periwound  intact;dry  -MF      Periwound Temperature  warm  -MF      Periwound Skin Turgor  soft  -MF      Edges  open  -MF      Wound Length (cm)  1.2 cm  -MF      Wound Width (cm)  1.3 cm  -MF      Wound Depth (cm)  0.2 cm  -MF      Drainage Characteristics/Odor  serosanguineous  -MF      Drainage Amount  small  -MF      Care, Wound  irrigated with;sterile normal saline  -MF      Dressing Care  foam;low-adherent;silver impregnated;multi-layer wrap therahoney with mepilex Ag foam and kerlix to secure  -MF      Retired Wound - Properties Group Date first assessed: 10/07/20  - Time first assessed: 1430  - Side: Left  - Location: leg  -MF Primary Wound Type: Venous ulcer  -MF      User Key  (r) = Recorded By, (t) = Taken By, (c) = Cosigned By    Initials Name Provider Type     Eliazar Schrader, PT Physical Therapist        Lymphedema     Row Name 10/07/20 1430             Lymphedema Edema Assessment    Ptting Edema Category  By severity  -      Pitting Edema  Moderate  -         Skin Changes/Observations    Location/Assessment  Lower Extremity  -      Lower Extremity Conditions  bilateral:;dry;scaly  -      Lower Extremity Color/Pigment  bilateral:;hyperpigmented  -         Lymphedema Pulses/Capillary Refill    Lymphedema Pulses/Capillary Refill  lower extremity pulses;capillary refill  -      Dorsalis Pedis Pulse  right:;left: unable to assess through edema  -      Capillary Refill  lower extremity capillary refill  -      Lower Extremity Capillary Refill  right:;left:;less than 3 seconds  -         Compression/Skin Care    Compression/Skin Care  skin care;wrapping location;bandaging  -MF      Skin Care  washed/dried;lotion applied  -MF      Wrapping Location  lower extremity  -MF      Wrapping Location LE  left:;foot to  knee  -MF      Wrapping Comments  LLE with compressogrip size 4 doubled at foot for gradient compression.   -MF        User Key  (r) = Recorded By, (t) = Taken By, (c) = Cosigned By    Initials Name Provider Type    Eliazar Corea, PT Physical Therapist          WOUND DEBRIDEMENT                   Therapy Education     Row Name 10/07/20 1430             Therapy Education    Given  Bandaging/dressing change;Edema management  -      Program  New  -MF      How Provided  Verbal;Demonstration  -MF      Provided to  Patient  -MF      Level of Understanding  Verbalized;Demonstrated  -MF        User Key  (r) = Recorded By, (t) = Taken By, (c) = Cosigned By    Initials Name Provider Type    Eliazar Corea, PT Physical Therapist          Recommendation and Plan  PT Assessment/Plan     Row Name 10/07/20 1525          PT Assessment    Functional Limitations  Impaired gait;Other (comment) wound care  -MF     Assessment Comments  Pt presents with chronic LLE wounds with history of LE edema.  Pt was covering wound with mepilex Ag foam for the last several days and has shown some improvement in wound appearance, per pt report, with transition to Ag foam.  PT added therahoney as wound bed noted to be a little dry.  therahoney will help increase autolytic debridement of adherent slough to improve wound healing. PT also fit pt with compressogrip stocking to help increase venous return to decrease LE edema to improve healing potential.  -MF     Rehab Potential  Fair  -MF     Patient/caregiver participated in establishment of treatment plan and goals  Yes  -MF     Patient would benefit from skilled therapy intervention  Yes  -MF        PT Plan    PT Frequency  2x/week  -MF     Predicted Duration of Therapy Intervention (OT)  6-8 weeks  -MF     Planned CPT's?  PT EVAL MOD COMPLELITY: 45555;PT MULTI LAYER COMP SYS LE;PT UNNA BOOT: 70006;PT NLFU MIST: 91063;PT SEYMOUR DEBRIDE OPEN WOUND UP TO 20 CM: 19339;PT SELF  CARE/MGMT/TRAIN 15 MIN: 82937  -     Physical Therapy Interventions (Optional Details)  wound care;patient/family education  -     PT Plan Comments  compression wrapping with home dressing changes every other day.  Pt to return in 4-5 days to assess response to wounds with possible addition of MIST therapy if wounds slow to heal.  -       User Key  (r) = Recorded By, (t) = Taken By, (c) = Cosigned By    Initials Name Provider Type    Eliazar Corea, PT Physical Therapist            Goals  PT OP Goals     Row Name 10/07/20 1430          PT Short Term Goals    STG Date to Achieve  11/21/20  -     STG 1  Decrease LLE wounds by 25% each as evidence of improved wound healing  -     STG 2  Decrease LE edema to none/scant to improve skin integrity   -        Long Term Goals    LTG Date to Achieve  01/05/21  -     LTG 1  Decrease LLE wounds by 75% each as evidence of improved skin integrity   -     LTG 2  Pt independent with home management of wounds and LE compression   -        Time Calculation    PT Goal Re-Cert Due Date  01/05/21  -       User Key  (r) = Recorded By, (t) = Taken By, (c) = Cosigned By    Initials Name Provider Type    Eliazar Corea, PT Physical Therapist          Time Calculation: Start Time: 1430  Therapy Charges for Today     Code Description Service Date Service Provider Modifiers Qty    89751080036 HC PT MULTI LAYER COMP SYS BELOW KNEE 10/7/2020 Eliazar Schrader, PT GP 1    72958164841 HC PT EVAL MOD COMPLEXITY 4 10/7/2020 Eliazar Schrader, PT GP 1                Eliazar Schrader, PT  10/7/2020

## 2020-10-14 ENCOUNTER — HOSPITAL ENCOUNTER (OUTPATIENT)
Dept: PHYSICAL THERAPY | Facility: HOSPITAL | Age: 80
Setting detail: THERAPIES SERIES
Discharge: HOME | End: 2020-10-14

## 2020-10-14 DIAGNOSIS — S81.802D OPEN WOUND OF LEFT LOWER LEG, SUBSEQUENT ENCOUNTER: ICD-10-CM

## 2020-10-14 DIAGNOSIS — I87.8 VENOUS STASIS: Primary | ICD-10-CM

## 2020-10-14 PROCEDURE — 29581 APPL MULTLAYER CMPRN SYS LEG: CPT

## 2020-10-14 PROCEDURE — 97597 DBRDMT OPN WND 1ST 20 CM/<: CPT

## 2020-10-14 NOTE — THERAPY WOUND CARE TREATMENT
"    Outpatient Rehabilitation - Wound/Debridement Treatment Note   [facility]     Patient Name: Antione Crum  : 1940  MRN: [id number]  Today's Date: 10/14/2020                  Admit Date: 10/14/2020    Visit Dx:    ICD-10-CM ICD-9-CM   1. Venous stasis  I87.8 459.81   2. Open wound of left lower leg, subsequent encounter  S81.802D V58.89     891.0       Patient Active Problem List   Diagnosis   • Acute left ankle pain   • Venous stasis   • Neuropathy   • Prediabetes   • Closed trimalleolar fracture of left ankle   • S/P ORIF left trimalleolar fracture   • HLD (hyperlipidemia)   • HTN (hypertension)   • Hypothyroid   • Leukocytosis, likely reactive   • Acute postoperative pain   • Controlled type 2 diabetes mellitus with diabetic neuropathy, without long-term current use of insulin (CMS/HCC)        Past Medical History:   Diagnosis Date   • Arthritis     multi site    • Bronchiectasis (CMS/HCC)     causes SOA   • Dyspnea    • Elevated cholesterol    • History of fall 2018    injured left ankle   • Hypertension    • Hypothyroidism    • Presence of dental bridge     upper    • Suspected sleep apnea     tested three times but ruled out with third test         Past Surgical History:   Procedure Laterality Date   • ANKLE OPEN REDUCTION INTERNAL FIXATION Left 2018    Procedure: OPEN REDUCTION INTERNAL FIXATION LEFT TRIMALL ANKLE FRACTURE  ;  Surgeon: Cheryl Palmer MD;  Location: [facility];  Service: Orthopedics   • COLONOSCOPY     • HAND SURGERY Bilateral     reconstruct thumb sockets    • TURP / TRANSURETHRAL INCISION / DRAINAGE PROSTATE  2015         EVALUATION  PT Ortho     Row Name 10/14/20 1400       Subjective Comments    Subjective Comments  Patient states wound are improving.  -MP       Subjective Pain    Able to rate subjective pain?  yes  -MP    Pre-Treatment Pain Level  1  -MP    Post-Treatment Pain Level  1  -MP    Subjective Pain Comment  \"burns alittle\"  -MP       Transfers    " Sit-Stand [place] (Transfers)  independent  -MP    Stand-Sit [place] (Transfers)  independent  -MP    Comment (Transfers)  Seated for tx  -MP      User Key  (r) = Recorded By, (t) = Taken By, (c) = Cosigned By    Initials Name Provider Type    Juan Leyva PT Physical Therapist          LDA Wound     Row Name 10/14/20 1400             Wound 10/07/20 1430 Left upper leg Venous Ulcer    Wound - Properties Group Placement Date: 10/07/20  - Placement Time: 1430 - Side: Left  - Orientation: upper  - Location: leg  -MF Primary Wound Type: Venous ulcer  -MF    Wound Image  Images linked: 1  -MP      Dressing Appearance  intact;moist drainage  -MP      Base  moist;pink;red;slough;yellow  -MP      Periwound  intact;dry  -MP      Periwound Temperature  warm  -MP      Periwound Skin Turgor  soft  -MP      Edges  open  -MP      Wound Length (cm)  1.2 cm  -MP      Wound Width (cm)  0.5 cm  -MP      Wound Depth (cm)  0.2 cm  -MP      Drainage Characteristics/Odor  serosanguineous  -MP      Drainage Amount  scant  -MP      Care, Wound  cleansed with;wound cleanser;debrided;honey applied  -MP      Dressing Care  dressing applied;silver impregnated;low-adherent;foam;multi-layer wrap Therahoney, Mepilex Ag, MLW  -MP      Periwound Care  cleansed with pH balanced cleanser;dry periwound area maintained  -MP      Retired Wound - Properties Group Date first assessed: 10/07/20  - Time first assessed: 1430 - Side: Left  - Location: leg  -MF Primary Wound Type: Venous ulcer  -MF       Wound 10/07/20 1430 Left lower leg Venous Ulcer    Wound - Properties Group Placement Date: 10/07/20  - Placement Time: 1430 - Side: Left  - Orientation: lower  - Location: leg  -MF Primary Wound Type: Venous ulcer  -MF    Dressing Appearance  dry;moist drainage  -MP      Base  moist;pink;red;slough;yellow  -MP      Periwound  intact;dry  -MP      Periwound Temperature  warm  -MP      Periwound Skin Turgor  soft  -MP       Edges  open  -MP      Wound Length (cm)  1.2 cm  -MP      Wound Width (cm)  1.4 cm  -MP      Wound Depth (cm)  0.2 cm  -MP      Drainage Characteristics/Odor  serosanguineous  -MP      Drainage Amount  small  -MP      Care, Wound  cleansed with;wound cleanser;debrided  -MP      Dressing Care  dressing applied;silver impregnated;low-adherent;foam;multi-layer wrap Kiya, Mepilex Ag, MLW  -MP      Retired Wound - Properties Group Date first assessed: 10/07/20  - Time first assessed: 1430  - Side: Left  - Location: leg  - Primary Wound Type: Venous ulcer  -      User Key  (r) = Recorded By, (t) = Taken By, (c) = Cosigned By    Initials Name Provider Type    Eliazar Corea, PT Physical Therapist    Juan Leyva, SIOMARA Physical Therapist        Lymphedema     Row Name 10/14/20 1400             Lymphedema Edema Assessment    Ptting Edema Category  By severity  -MP      Pitting Edema  Moderate  -MP         Skin Changes/Observations    Location/Assessment  Lower Extremity  -MP      Lower Extremity Conditions  bilateral:;dry;scaly  -MP      Lower Extremity Color/Pigment  bilateral:;hyperpigmented  -MP         Lymphedema Pulses/Capillary Refill    Lymphedema Pulses/Capillary Refill  lower extremity pulses;capillary refill  -MP      Dorsalis Pedis Pulse  right:;left: unable to assess through edema  -MP      Capillary Refill  lower extremity capillary refill  -MP      Lower Extremity Capillary Refill  right:;left:;less than 3 seconds  -MP         Compression/Skin Care    Compression/Skin Care  skin care;wrapping location;bandaging  -MP      Skin Care  washed/dried;lotion applied  -MP      Wrapping Location  lower extremity  -MP      Wrapping Location LE  left:;foot to knee  -MP      Wrapping Comments  Dressings, size 4/5 compressogrip doubled and overlapping for gradient compression  -MP        User Key  (r) = Recorded By, (t) = Taken By, (c) = Cosigned By    Initials Name Provider Type    SIOMARA Rich  Juan PT Physical Therapist          WOUND DEBRIDEMENT  Total area of Debridement: 3 cm2  Debridement Site 1  Location- Site 1: L LE  Selective Debridement- Site 1: Wound Surface <20cmsq  Instruments- Site 1: tweezers  Excised Tissue Description- Site 1: minimum, slough  Bleeding- Site 1: none             Therapy Education     Row Name 10/14/20 1400             Therapy Education    Education Details  Continue with current POC  -MP      Given  Bandaging/dressing change;Edema management  -MP      Program  New  -MP      How Provided  Verbal;Demonstration  -MP      Provided to  Patient  -MP      Level of Understanding  Verbalized;Demonstrated  -MP        User Key  (r) = Recorded By, (t) = Taken By, (c) = Cosigned By    Initials Name Provider Type    Juan Leyva PT Physical Therapist          Recommendation and Plan  PT Assessment/Plan     Row Name 10/14/20 1400          PT Assessment    Functional Limitations  Impaired gait;Other (comment) wound care  -MP     Assessment Comments  Pt presents with decreased measurements of proximal wound and stable measurements of distal wound. PT able to debride minimal slough from wound bed this date. Slight increase in periwound redness noted but no warmth or odor present. Patient would continue to benefit from skilled PT wound care to promote increased wound healing potential.  -MP     Rehab Potential  Fair  -MP     Patient/caregiver participated in establishment of treatment plan and goals  Yes  -MP     Patient would benefit from skilled therapy intervention  Yes  -MP        PT Plan    PT Frequency  1x/week;2x/week  -MP     Physical Therapy Interventions (Optional Details)  patient/family education;wound care  -MP     PT Plan Comments  Compression, debridement, ?MIST  -MP       User Key  (r) = Recorded By, (t) = Taken By, (c) = Cosigned By    Initials Name Provider Type    Juan Leyva PT Physical Therapist          Goals  PT OP Goals     Row Name 10/14/20 1400           Time Calculation    PT Goal Re-Cert Due Date  01/05/21  -SIOMARA       User Key  (r) = Recorded By, (t) = Taken By, (c) = Cosigned By    Initials Name Provider Type    Juan Leyva, PT Physical Therapist          PT Goal Re-Cert Due Date: 01/05/21            Time Calculation: Start Time: 1400  Therapy Charges for Today     Code Description Service Date Service Provider Modifiers Qty    60429169694 HC SEYMOUR DEBRIDE OPEN WOUND UP TO 20CM 10/14/2020 Juan Rich, PT GP 1    03430321101 HC PT MULTI LAYER COMP SYS BELOW KNEE 10/14/2020 Juan Rich, PT GP 1                  Juan Rich, PT  10/14/2020

## 2020-10-21 ENCOUNTER — HOSPITAL ENCOUNTER (OUTPATIENT)
Dept: PHYSICAL THERAPY | Facility: HOSPITAL | Age: 80
Setting detail: THERAPIES SERIES
Discharge: HOME | End: 2020-10-21

## 2020-10-21 DIAGNOSIS — S81.802D OPEN WOUND OF LEFT LOWER LEG, SUBSEQUENT ENCOUNTER: ICD-10-CM

## 2020-10-21 DIAGNOSIS — I87.8 VENOUS STASIS: Primary | ICD-10-CM

## 2020-10-21 PROCEDURE — 97597 DBRDMT OPN WND 1ST 20 CM/<: CPT

## 2020-10-21 PROCEDURE — 29581 APPL MULTLAYER CMPRN SYS LEG: CPT

## 2020-10-21 NOTE — THERAPY WOUND CARE TREATMENT
Outpatient Rehabilitation - Wound/Debridement Treatment Note   [place]     Patient Name: Antione Crum  : 1940  MRN: [id number]  Today's Date: 10/21/2020                 Admit Date: 10/21/2020    Visit Dx:    ICD-10-CM ICD-9-CM   1. Venous stasis  I87.8 459.81   2. Open wound of left lower leg, subsequent encounter  S81.802D V58.89     891.0       Patient Active Problem List   Diagnosis   • Acute left ankle pain   • Venous stasis   • Neuropathy   • Prediabetes   • Closed trimalleolar fracture of left ankle   • S/P ORIF left trimalleolar fracture   • HLD (hyperlipidemia)   • HTN (hypertension)   • Hypothyroid   • Leukocytosis, likely reactive   • Acute postoperative pain   • Controlled type 2 diabetes mellitus with diabetic neuropathy, without long-term current use of insulin (CMS/HCC)        Past Medical History:   Diagnosis Date   • Arthritis     multi site    • Bronchiectasis (CMS/HCC)     causes SOA   • Dyspnea    • Elevated cholesterol    • History of fall 2018    injured left ankle   • Hypertension    • Hypothyroidism    • Presence of dental bridge     upper    • Suspected sleep apnea     tested three times but ruled out with third test         Past Surgical History:   Procedure Laterality Date   • ANKLE OPEN REDUCTION INTERNAL FIXATION Left 2018    Procedure: OPEN REDUCTION INTERNAL FIXATION LEFT TRIMALL ANKLE FRACTURE  ;  Surgeon: Cheryl Palmer MD;  Location: [facility];  Service: Orthopedics   • COLONOSCOPY     • HAND SURGERY Bilateral     reconstruct thumb sockets    • TURP / TRANSURETHRAL INCISION / DRAINAGE PROSTATE  2015         EVALUATION  PT Ortho     Row Name 10/21/20 1300       Subjective Comments    Subjective Comments  No complaints, just mild burning of wound sometimes.  -JM       Subjective Pain    Able to rate subjective pain?  yes  -JM    Pre-Treatment Pain Level  1  -JM    Post-Treatment Pain Level  1  -JM       Transfers    Sit-Stand [place] (Transfers)   independent  -JM    Stand-Sit [place] (Transfers)  independent  -JM    Comment (Transfers)  long sitting on stretcher for tx  -JM      User Key  (r) = Recorded By, (t) = Taken By, (c) = Cosigned By    Initials Name Provider Type    Janeen Austin PT Physical Therapist          LDA Wound     Row Name 10/21/20 1300             Wound 10/21/20 1300 Left distal leg    Wound - Properties Group Placement Date: 10/21/20  - Placement Time: 1300  -JM Side: Left  -JM Orientation: distal  -JM Location: leg  -JM    Dressing Appearance  intact;moist drainage  -JM      Base  moist;red  -JM      Periwound  intact;moist;pink  -JM      Periwound Temperature  warm  -JM      Periwound Skin Turgor  soft  -JM      Edges  open  -JM      Wound Length (cm)  0.3 cm  -JM      Wound Width (cm)  0.2 cm  -JM      Wound Depth (cm)  0.1 cm  -JM      Drainage Characteristics/Odor  serosanguineous  -JM      Drainage Amount  small  -JM      Care, Wound  cleansed with;wound cleanser  -JM      Dressing Care  dressing applied;silver impregnated;foam;gauze;multi-layer wrap  -JM      Periwound Care  dry periwound area maintained  -      Retired Wound - Properties Group Date first assessed: 10/21/20  - Time first assessed: 1300  -JM Side: Left  -JM Location: leg  -JM       Wound 10/07/20 1430 Left lower leg Venous Ulcer    Wound - Properties Group Placement Date: 10/07/20  - Placement Time: 1430  -MF Side: Left  - Orientation: lower  -MF Location: leg  -MF Primary Wound Type: Venous ulcer  -MF    Dressing Appearance  intact;moist drainage  -JM      Base  moist;pink;red;slough;yellow  -JM      Periwound  intact;moist;pink  -JM      Periwound Temperature  warm  -JM      Periwound Skin Turgor  soft  -JM      Edges  open  -JM      Wound Length (cm)  1.8 cm  -JM      Wound Width (cm)  2.3 cm  -JM      Wound Depth (cm)  0.2 cm  -JM      Drainage Characteristics/Odor  serosanguineous  -JM      Drainage Amount  small  -JM      Care, Wound   "cleansed with;wound cleanser;debrided  -JM      Dressing Care  dressing applied;silver impregnated;foam;gauze;multi-layer wrap mepilex ag, 3\" conform, MLW  -JM      Periwound Care  cleansed with pH balanced cleanser;dry periwound area maintained  -JM      Retired Wound - Properties Group Date first assessed: 10/07/20  - Time first assessed: 1430  -MF Side: Left  - Location: leg  - Primary Wound Type: Venous ulcer  -MF       Wound 10/07/20 1430 Left upper leg Venous Ulcer    Wound - Properties Group Placement Date: 10/07/20  - Placement Time: 1430  -MF Side: Left  - Orientation: upper  - Location: leg  -MF Primary Wound Type: Venous ulcer  -MF    Wound Image  Images linked: 1  -JM      Dressing Appearance  intact;moist drainage  -      Base  moist;pink;red;slough;yellow  -JM      Periwound  intact;dry;moist  -JM      Periwound Temperature  warm  -      Periwound Skin Turgor  soft  -      Edges  open  -      Wound Length (cm)  1.5 cm  -JM      Wound Width (cm)  0.7 cm  -      Wound Depth (cm)  0.2 cm  -JM      Drainage Characteristics/Odor  serosanguineous  -JM      Drainage Amount  small  -JM      Care, Wound  cleansed with;wound cleanser;debrided  -JM      Dressing Care  dressing applied;silver impregnated;foam;gauze;multi-layer wrap mepilex ag, 3\" conform, MLW  -JM      Periwound Care  cleansed with pH balanced cleanser;dry periwound area maintained  -JM      Retired Wound - Properties Group Date first assessed: 10/07/20  - Time first assessed: 1430  -MF Side: Left  -MF Location: leg  -MF Primary Wound Type: Venous ulcer  -MF      User Key  (r) = Recorded By, (t) = Taken By, (c) = Cosigned By    Initials Name Provider Type    Eliazar Corea, PT Physical Therapist    Janeen Austin, PT Physical Therapist        Lymphedema     Row Name 10/21/20 1300             Lymphedema Edema Assessment    Pitting Edema  Moderate  -JM         Skin Changes/Observations    Lower Extremity " "Conditions  left:;clean;dry  -      Lower Extremity Color/Pigment  left:;hyperpigmented  -JM         Lymphedema Pulses/Capillary Refill    Lower Extremity Capillary Refill  left:;less than 3 seconds  -JM         Compression/Skin Care    Skin Care  washed/dried  -JM      Wrapping Location  lower extremity  -JM      Wrapping Location LE  left:;foot to knee  -      Wrapping Comments  ag foam dressing, 3\" conform to secure, size 4&5 compressogrips doubled/overlapping for gradient multilayer compression  -        User Key  (r) = Recorded By, (t) = Taken By, (c) = Cosigned By    Initials Name Provider Type    Janeen Austin, PT Physical Therapist          WOUND DEBRIDEMENT  Total area of Debridement: 2cmsq  Debridement Site 1  Location- Site 1: L LE  Selective Debridement- Site 1: Wound Surface <20cmsq  Instruments- Site 1: tweezers  Excised Tissue Description- Site 1: minimum, slough  Bleeding- Site 1: none             Therapy Education     Row Name 10/21/20 1300             Therapy Education    Education Details  HOLD honey, continue ag foam dressings, if skin around wound is staying wet, may increase to daily dressing changes  -      Given  Bandaging/dressing change;Edema management  -      Program  Modified  -      How Provided  Verbal;Demonstration  -      Provided to  Patient  -      Level of Understanding  Verbalized;Demonstrated  -        User Key  (r) = Recorded By, (t) = Taken By, (c) = Cosigned By    Initials Name Provider Type    Janeen Austin, PT Physical Therapist          Recommendation and Plan  PT Assessment/Plan     Row Name 10/21/20 1300          PT Assessment    Functional Limitations  Impaired gait;Other (comment) wound care  -     Impairments  Integumentary integrity  -     Assessment Comments  LLE wounds with increase in area, but less slough, periwounds macerated, so PT held therahoney today, continued with ag foam and MLW.  Will assess next tx.  May consider " MIST if not progressing or alternative dressing.  -        PT Plan    PT Frequency  1x/week;2x/week  -     Physical Therapy Interventions (Optional Details)  patient/family education;wound care  -     PT Plan Comments  debridement, compression, ?MIST if indicated  -       User Key  (r) = Recorded By, (t) = Taken By, (c) = Cosigned By    Initials Name Provider Type    Janeen Austin, PT Physical Therapist              Time Calculation: Start Time: 1300  Therapy Charges for Today     Code Description Service Date Service Provider Modifiers Qty    49871650826 HC SEYMOUR DEBRIDE OPEN WOUND UP TO 20CM 10/21/2020 Janeen Martinez, PT GP 1    42976259366 HC PT MULTI LAYER COMP SYS BELOW KNEE 10/21/2020 Janeen Martinez, PT GP 1                  Janeen Martinez, PT  10/21/2020

## 2020-10-28 ENCOUNTER — HOSPITAL ENCOUNTER (OUTPATIENT)
Dept: PHYSICAL THERAPY | Facility: HOSPITAL | Age: 80
Setting detail: THERAPIES SERIES
Discharge: HOME | End: 2020-10-28

## 2020-10-28 DIAGNOSIS — I87.8 VENOUS STASIS: Primary | ICD-10-CM

## 2020-10-28 DIAGNOSIS — S81.802D OPEN WOUND OF LEFT LOWER LEG, SUBSEQUENT ENCOUNTER: ICD-10-CM

## 2020-10-28 PROCEDURE — 97610 LOW FREQUENCY NON-THERMAL US: CPT

## 2020-10-28 PROCEDURE — 97597 DBRDMT OPN WND 1ST 20 CM/<: CPT

## 2020-10-28 PROCEDURE — 29581 APPL MULTLAYER CMPRN SYS LEG: CPT

## 2020-10-28 NOTE — THERAPY WOUND CARE TREATMENT
Outpatient Rehabilitation - Wound/Debridement Treatment Note   [place]     Patient Name: Antione Crum  : 1940  MRN: [id number]  Today's Date: 10/28/2020                  Admit Date: 10/28/2020    Visit Dx:    ICD-10-CM ICD-9-CM   1. Venous stasis  I87.8 459.81   2. Open wound of left lower leg, subsequent encounter  S81.802D V58.89     891.0       Patient Active Problem List   Diagnosis   • Acute left ankle pain   • Venous stasis   • Neuropathy   • Prediabetes   • Closed trimalleolar fracture of left ankle   • S/P ORIF left trimalleolar fracture   • HLD (hyperlipidemia)   • HTN (hypertension)   • Hypothyroid   • Leukocytosis, likely reactive   • Acute postoperative pain   • Controlled type 2 diabetes mellitus with diabetic neuropathy, without long-term current use of insulin (CMS/HCC)        Past Medical History:   Diagnosis Date   • Arthritis     multi site    • Bronchiectasis (CMS/HCC)     causes SOA   • Dyspnea    • Elevated cholesterol    • History of fall 2018    injured left ankle   • Hypertension    • Hypothyroidism    • Presence of dental bridge     upper    • Suspected sleep apnea     tested three times but ruled out with third test         Past Surgical History:   Procedure Laterality Date   • ANKLE OPEN REDUCTION INTERNAL FIXATION Left 2018    Procedure: OPEN REDUCTION INTERNAL FIXATION LEFT TRIMALL ANKLE FRACTURE  ;  Surgeon: Cheryl Palmer MD;  [facility]  Service: Orthopedics   • COLONOSCOPY     • HAND SURGERY Bilateral     reconstruct thumb sockets    • TURP / TRANSURETHRAL INCISION / DRAINAGE PROSTATE  2015         EVALUATION  PT Ortho     Row Name 10/28/20 1300       Subjective Comments    Subjective Comments  Patient reports new onset of sharp, shooting pain in foot that improves with standing.  -MP       Subjective Pain    Able to rate subjective pain?  yes  -MP    Pre-Treatment Pain Level  2  -MP    Post-Treatment Pain Level  2  -MP       Transfers     Sit-Stand [place] (Transfers)  independent  -MP    Stand-Sit [place] (Transfers)  independent  -MP    Comment (Transfers)  long sitting for tx  -MP      User Key  (r) = Recorded By, (t) = Taken By, (c) = Cosigned By    Initials Name Provider Type    Juan Leyva PT Physical Therapist          LDA Wound     Row Name 10/28/20 1300             Wound 10/21/20 1300 Left distal leg    Wound - Properties Group Placement Date: 10/21/20  - Placement Time: 1300  -JM Side: Left  -JM Orientation: distal  -JM Location: leg  -JM    Dressing Appearance  intact;moist drainage  -MP      Base  moist;red  -MP      Periwound  intact;moist;pink  -MP      Periwound Temperature  warm  -MP      Periwound Skin Turgor  soft  -MP      Edges  open  -MP      Wound Length (cm)  0.2 cm  -MP      Wound Width (cm)  0.2 cm  -MP      Wound Depth (cm)  0.1 cm  -MP      Drainage Characteristics/Odor  serosanguineous  -MP      Drainage Amount  small  -MP      Care, Wound  cleansed with;wound cleanser;debrided;ultrasound therapy, non contact low frequency Mist x 7 minutes to all wounds  -MP      Dressing Care  dressing applied;silver impregnated;low-adherent;foam;multi-layer wrap Mepilex Ag, 3'' conform, size 4/5 compressogrip  -MP      Periwound Care  cleansed with pH balanced cleanser;dry periwound area maintained  -MP      Retired Wound - Properties Group Date first assessed: 10/21/20  - Time first assessed: 1300  -JM Side: Left  - Location: leg  -JM       Wound 10/07/20 1430 Left lower leg Venous Ulcer    Wound - Properties Group Placement Date: 10/07/20  - Placement Time: 1430  -MF Side: Left  - Orientation: lower  -MF Location: leg  -MF Primary Wound Type: Venous ulcer  -MF    Dressing Appearance  intact;moist drainage  -MP      Base  moist;pink;red;slough;yellow  -MP      Periwound  intact;moist;pink  -MP      Periwound Temperature  warm  -MP      Periwound Skin Turgor  soft  -MP      Edges  open  -MP      Wound Length  (cm)  1.9 cm  -MP      Wound Width (cm)  2 cm  -MP      Wound Depth (cm)  0.2 cm  -MP      Drainage Characteristics/Odor  serosanguineous  -MP      Drainage Amount  small  -MP      Care, Wound  cleansed with;wound cleanser;debrided;ultrasound therapy, non contact low frequency Mist x 8 minutes to all wounds  -MP      Dressing Care  dressing applied;silver impregnated;low-adherent;foam;multi-layer wrap Mepilex Ag, 3'' conform, size 4/5 compressogrip  -MP      Periwound Care  cleansed with pH balanced cleanser  -MP      Retired Wound - Properties Group Date first assessed: 10/07/20  - Time first assessed: 1430  - Side: Left  - Location: leg  - Primary Wound Type: Venous ulcer  -MF       Wound 10/07/20 1430 Left upper leg Venous Ulcer    Wound - Properties Group Placement Date: 10/07/20  - Placement Time: 1430 - Side: Left  - Orientation: upper  - Location: leg  -MF Primary Wound Type: Venous ulcer  -MF    Wound Image  Images linked: 1  -MP      Dressing Appearance  intact;moist drainage  -MP      Base  moist;pink;red;slough;yellow  -MP      Periwound  intact;dry;moist  -MP      Periwound Temperature  warm  -MP      Periwound Skin Turgor  soft  -MP      Edges  open  -MP      Wound Length (cm)  1.6 cm  -MP      Wound Width (cm)  0.6 cm  -MP      Wound Depth (cm)  0.2 cm  -MP      Drainage Characteristics/Odor  serosanguineous  -MP      Drainage Amount  small  -MP      Care, Wound  cleansed with;wound cleanser;debrided;ultrasound therapy, non contact low frequency Mist x 8 minutes to all wounds  -MP      Dressing Care  dressing applied;silver impregnated;low-adherent;foam;multi-layer wrap Mepilex Ag, 3'' conform, size 4/5 compressogrip  -MP      Periwound Care  cleansed with pH balanced cleanser;dry periwound area maintained  -MP      Retired Wound - Properties Group Date first assessed: 10/07/20  - Time first assessed: 1430  -MF Side: Left  - Location: leg  -MF Primary Wound Type: Venous ulcer  -MF       User Key  (r) = Recorded By, (t) = Taken By, (c) = Cosigned By    Initials Name Provider Type    Eliazar Corea, PT Physical Therapist    Janeen Austin, PT Physical Therapist    Juan Leyva, PT Physical Therapist        Lymphedema     Row Name 10/28/20 1300             Lymphedema Edema Assessment    Ptting Edema Category  By severity  -MP      Pitting Edema  Moderate  -MP         Skin Changes/Observations    Location/Assessment  Lower Extremity  -MP      Lower Extremity Conditions  left:;clean;dry  -MP      Lower Extremity Color/Pigment  left:;hyperpigmented  -MP         Lymphedema Pulses/Capillary Refill    Lymphedema Pulses/Capillary Refill  lower extremity pulses;capillary refill  -MP      Dorsalis Pedis Pulse  right:;left: unable to assess through edema  -MP      Capillary Refill  lower extremity capillary refill  -MP      Lower Extremity Capillary Refill  left:;less than 3 seconds  -MP         Compression/Skin Care    Compression/Skin Care  skin care;wrapping location;bandaging  -MP      Skin Care  washed/dried  -MP      Wrapping Location  lower extremity  -MP      Wrapping Location LE  left:;foot to knee  -MP      Wrapping Comments  Mepilex Ag, 3'' conform, size 4/5 compressogrip doubled and overlapping for gradient compression  -MP        User Key  (r) = Recorded By, (t) = Taken By, (c) = Cosigned By    Initials Name Provider Type    Juan Leyva, PT Physical Therapist          WOUND DEBRIDEMENT  Total area of Debridement: 2 cm2  Debridement Site 1  Location- Site 1: L LE  Selective Debridement- Site 1: Wound Surface <20cmsq  Instruments- Site 1: tweezers  Excised Tissue Description- Site 1: minimum, slough  Bleeding- Site 1: none             Therapy Education     Row Name 10/28/20 1300             Therapy Education    Education Details  Explained addition of MIST tx into POC and rationale for increasing PT frequency  -MP      Given  Bandaging/dressing change;Edema management   -MP      Program  Modified  -MP      How Provided  Verbal;Demonstration  -MP      Provided to  Patient  -MP      Level of Understanding  Verbalized;Demonstrated  -MP        User Key  (r) = Recorded By, (t) = Taken By, (c) = Cosigned By    Initials Name Provider Type    Juan Leyva PT Physical Therapist          Recommendation and Plan  PT Assessment/Plan     Row Name 10/28/20 1300          PT Assessment    Functional Limitations  Impaired gait;Other (comment) wound care  -MP     Impairments  Integumentary integrity  -MP     Assessment Comments  L LE wound measurements were stable this date. PT added MIST in treatment plan to promote increased wound healing potential. PT able to debride minimal amount of slough from wound bed. Periwound maceration much improvement. Slight increased in periwound erythema. No warmth or odor noted.  -MP        PT Plan    PT Frequency  2x/week  -MP     Physical Therapy Interventions (Optional Details)  patient/family education;wound care  -MP     PT Plan Comments  debridement, compression, MIST  -MP       User Key  (r) = Recorded By, (t) = Taken By, (c) = Cosigned By    Initials Name Provider Type    Juan Leyva PT Physical Therapist          Goals  PT OP Goals     Row Name 10/28/20 1300          Time Calculation    PT Goal Re-Cert Due Date  01/05/21  -MP       User Key  (r) = Recorded By, (t) = Taken By, (c) = Cosigned By    Initials Name Provider Type    Juan Leyva PT Physical Therapist          PT Goal Re-Cert Due Date: 01/05/21            Time Calculation: Start Time: 1300  Therapy Charges for Today     Code Description Service Date Service Provider Modifiers Qty    53215480554 HC SEYMOUR DEBRIDE OPEN WOUND UP TO 20CM 10/28/2020 Juan Rich, PT GP 1    84701596567 HC PT NLFU MIST 10/28/2020 Juan Rich, PT GP 1    [id number] HC PT MULTI LAYER COMP SYS BELOW KNEE 10/28/2020 Juan Rich, PT GP 1                  Juan Rich PT  10/28/2020

## 2020-10-30 ENCOUNTER — HOSPITAL ENCOUNTER (OUTPATIENT)
Dept: PHYSICAL THERAPY | Facility: HOSPITAL | Age: 80
Setting detail: THERAPIES SERIES
Discharge: HOME | End: 2020-10-30

## 2020-10-30 DIAGNOSIS — I87.8 VENOUS STASIS: Primary | ICD-10-CM

## 2020-10-30 DIAGNOSIS — S81.802D OPEN WOUND OF LEFT LOWER LEG, SUBSEQUENT ENCOUNTER: ICD-10-CM

## 2020-10-30 PROCEDURE — 97597 DBRDMT OPN WND 1ST 20 CM/<: CPT

## 2020-10-30 PROCEDURE — 29581 APPL MULTLAYER CMPRN SYS LEG: CPT

## 2020-10-30 PROCEDURE — 97610 LOW FREQUENCY NON-THERMAL US: CPT

## 2020-10-30 NOTE — THERAPY WOUND CARE TREATMENT
Outpatient Rehabilitation - Wound/Debridement Treatment Note  [facility]     Patient Name: Antinoe Crum  : 1940  MRN: [id number]  Today's Date: 10/30/2020                 Admit Date: 10/30/2020    Visit Dx:    ICD-10-CM ICD-9-CM   1. Venous stasis  I87.8 459.81   2. Open wound of left lower leg, subsequent encounter  S81.802D V58.89     891.0       Patient Active Problem List   Diagnosis   • Acute left ankle pain   • Venous stasis   • Neuropathy   • Prediabetes   • Closed trimalleolar fracture of left ankle   • S/P ORIF left trimalleolar fracture   • HLD (hyperlipidemia)   • HTN (hypertension)   • Hypothyroid   • Leukocytosis, likely reactive   • Acute postoperative pain   • Controlled type 2 diabetes mellitus with diabetic neuropathy, without long-term current use of insulin (CMS/HCC)        Past Medical History:   Diagnosis Date   • Arthritis     multi site    • Bronchiectasis (CMS/HCC)     causes SOA   • Dyspnea    • Elevated cholesterol    • History of fall 2018    injured left ankle   • Hypertension    • Hypothyroidism    • Presence of dental bridge     upper    • Suspected sleep apnea     tested three times but ruled out with third test         Past Surgical History:   Procedure Laterality Date   • ANKLE OPEN REDUCTION INTERNAL FIXATION Left 2018    Procedure: OPEN REDUCTION INTERNAL FIXATION LEFT TRIMALL ANKLE FRACTURE  ;  Surgeon: Cheryl Palmer MD;  Location: formerly [facility];  Service: Orthopedics   • COLONOSCOPY     • HAND SURGERY Bilateral     reconstruct thumb sockets    • TURP / TRANSURETHRAL INCISION / DRAINAGE PROSTATE  2015         EVALUATION  PT Ortho     Row Name 10/30/20 1300       Subjective Comments    Subjective Comments  No complaints.  States he has some compression stockings to use once the wounds are healed.  -JM       Subjective Pain    Able to rate subjective pain?  yes  -JM    Pre-Treatment Pain Level  1  -JM    Post-Treatment Pain Level  1  -JM    Subjective Pain  "Comment  mild burning  -JM       Transfers    Sit-Stand [place] (Transfers)  independent  -JM    Stand-Sit [place] (Transfers)  independent  -JM    Comment (Transfers)  seated for tx  -JM      User Key  (r) = Recorded By, (t) = Taken By, (c) = Cosigned By    Initials Name Provider Type    Janeen Austin, PT Physical Therapist          LDA Wound     Row Name 10/30/20 1300             Wound 10/21/20 1300 Left distal leg    Wound - Properties Group Placement Date: 10/21/20  - Placement Time: 1300  -JM Side: Left  -JM Orientation: distal  -JM Location: leg  -JM    Dressing Appearance  intact;moist drainage  -JM      Base  moist;red;epithelialization pinpoint open area remaining  -JM      Periwound  intact;moist;pink  -JM      Periwound Temperature  warm  -JM      Periwound Skin Turgor  soft  -JM      Edges  open  -JM      Drainage Characteristics/Odor  serosanguineous  -JM      Drainage Amount  small  -JM      Care, Wound  cleansed with;wound cleanser;debrided;ultrasound therapy, non contact low frequency MIST 8min total to LLE wounds  -JM      Dressing Care  dressing applied;foam;silver impregnated;gauze;multi-layer wrap mepilex ag, 3\" conform, MLW  -JM      Periwound Care  cleansed with pH balanced cleanser;dry periwound area maintained  -JM      Retired Wound - Properties Group Date first assessed: 10/21/20  - Time first assessed: 1300  -JM Side: Left  -JM Location: leg  -JM       Wound 10/07/20 1430 Left lower leg Venous Ulcer    Wound - Properties Group Placement Date: 10/07/20  - Placement Time: 1430  -MF Side: Left  -MF Orientation: lower  -MF Location: leg  -MF Primary Wound Type: Venous ulcer  -MF    Dressing Appearance  intact;moist drainage  -JM      Base  moist;pink;red;slough;yellow  -JM      Periwound  intact;moist;pink  -JM      Periwound Temperature  warm  -JM      Periwound Skin Turgor  soft  -JM      Edges  open  -JM      Drainage Characteristics/Odor  serosanguineous  -JM      " "Drainage Amount  small  -JM      Care, Wound  cleansed with;wound cleanser;debrided;ultrasound therapy, non contact low frequency MIST 8min  -JM      Dressing Care  dressing applied;collagen;silver impregnated;foam;gauze;multi-layer wrap carmenza, mepilex ag, 3\" conform, MLW  -JM      Periwound Care  cleansed with pH balanced cleanser;dry periwound area maintained  -JM      Retired Wound - Properties Group Date first assessed: 10/07/20  - Time first assessed: 1430  -MF Side: Left  - Location: leg  -MF Primary Wound Type: Venous ulcer  -MF       Wound 10/07/20 1430 Left upper leg Venous Ulcer    Wound - Properties Group Placement Date: 10/07/20  - Placement Time: 1430  -MF Side: Left  - Orientation: upper  - Location: leg  -MF Primary Wound Type: Venous ulcer  -MF    Dressing Appearance  intact;moist drainage  -      Base  moist;pink;red;slough;yellow  -      Periwound  intact;dry;moist  -      Periwound Temperature  warm  -      Periwound Skin Turgor  soft  -      Edges  open  -      Drainage Characteristics/Odor  serosanguineous  -      Drainage Amount  small  -JM      Care, Wound  cleansed with;wound cleanser;debrided;ultrasound therapy, non contact low frequency MIST 8min  -JM      Dressing Care  dressing applied;collagen;silver impregnated;foam;gauze;multi-layer wrap carmenza, mepilex ag, 3\" conform, MLW  -JM      Periwound Care  cleansed with pH balanced cleanser;dry periwound area maintained  -JM      Retired Wound - Properties Group Date first assessed: 10/07/20  - Time first assessed: 1430  -MF Side: Left  -MF Location: leg  -MF Primary Wound Type: Venous ulcer  -MF      User Key  (r) = Recorded By, (t) = Taken By, (c) = Cosigned By    Initials Name Provider Type    Eliazar Corea, PT Physical Therapist    Janeen Austin, [place] Physical Therapist        Lymphedema     Row Name 10/30/20 1300             Lymphedema Edema Assessment    Ptting Edema Category  By severity  -   "    Pitting Edema  Moderate  -         Skin Changes/Observations    Location/Assessment  Lower Extremity  -      Lower Extremity Conditions  left:;clean;dry  -      Lower Extremity Color/Pigment  left:;hyperpigmented  -         Lymphedema Pulses/Capillary Refill    Lymphedema Pulses/Capillary Refill  --  -      Dorsalis Pedis Pulse  --  -      Capillary Refill  lower extremity capillary refill  -      Lower Extremity Capillary Refill  left:;less than 3 seconds  -         Compression/Skin Care    Compression/Skin Care  skin care;wrapping location;bandaging  -      Skin Care  washed/dried;lotion applied  -      Wrapping Location  lower extremity  -JM      Wrapping Location LE  left:;foot to knee  -      Wrapping Comments  size 4&5 compressogrips, doubled/overlapping for gradient multilayer compression  -        User Key  (r) = Recorded By, (t) = Taken By, (c) = Cosigned By    Initials Name Provider Type    Janeen Austin, PT Physical Therapist          WOUND DEBRIDEMENT  Total area of Debridement: 2cmsq  Debridement Site 1  Location- Site 1: L LE  Selective Debridement- Site 1: Wound Surface <20cmsq  Instruments- Site 1: tweezers  Excised Tissue Description- Site 1: minimum, slough  Bleeding- Site 1: none             Therapy Education     Row Name 10/30/20 1300             Therapy Education    Given  Bandaging/dressing change;Edema management  -      Program  Modified  -      How Provided  Verbal;Demonstration  -      Provided to  Patient  -      Level of Understanding  Verbalized;Demonstrated  -        User Key  (r) = Recorded By, (t) = Taken By, (c) = Cosigned By    Initials Name Provider Type    Janeen Austni, PT Physical Therapist          Recommendation and Plan  PT Assessment/Plan     Row Name 10/30/20 1300          PT Assessment    Functional Limitations  Impaired gait;Other (comment) wound care  -     Impairments  Integumentary integrity  -     Assessment  Comments  Wound improved with less periwound redness, less maceration.  PT continued with MIST and added carmenza this tx to assist with wound healing, continued ag foam and MLW.  Continue current POC.  -        PT Plan    PT Frequency  2x/week  -PENNY     Physical Therapy Interventions (Optional Details)  patient/family education;wound care  -     PT Plan Comments  MIST, debridement, MLW  -       User Key  (r) = Recorded By, (t) = Taken By, (c) = Cosigned By    Initials Name Provider Type    Janeen Austin, PT Physical Therapist          Goals  PT OP Goals     Row Name 10/30/20 1300          Time Calculation    PT Goal Re-Cert Due Date  01/05/21  -       User Key  (r) = Recorded By, (t) = Taken By, (c) = Cosigned By    Initials Name Provider Type    Janeen Austin, PT Physical Therapist          PT Goal Re-Cert Due Date: 01/05/21            Time Calculation: Start Time: 1300  Therapy Charges for Today     Code Description Service Date Service Provider Modifiers Qty    61058574477 HC SEYMOUR DEBRIDE OPEN WOUND UP TO 20CM 10/30/2020 Janeen Martinez, PT GP 1    04883044399 HC PT NLFU MIST 10/30/2020 Janeen Martinez, PT GP 1    30258384859 HC PT MULTI LAYER COMP SYS BELOW KNEE 10/30/2020 Janeen Martinez, PT GP 1                  Janeen Martinez, PT  10/30/2020

## 2020-11-04 ENCOUNTER — HOSPITAL ENCOUNTER (OUTPATIENT)
Dept: PHYSICAL THERAPY | Facility: HOSPITAL | Age: 80
Setting detail: THERAPIES SERIES
Discharge: HOME | End: 2020-11-04

## 2020-11-04 DIAGNOSIS — I87.8 VENOUS STASIS: Primary | ICD-10-CM

## 2020-11-04 DIAGNOSIS — S81.802D OPEN WOUND OF LEFT LOWER LEG, SUBSEQUENT ENCOUNTER: ICD-10-CM

## 2020-11-04 PROCEDURE — 29581 APPL MULTLAYER CMPRN SYS LEG: CPT

## 2020-11-04 PROCEDURE — 97610 LOW FREQUENCY NON-THERMAL US: CPT

## 2020-11-04 PROCEDURE — 97597 DBRDMT OPN WND 1ST 20 CM/<: CPT

## 2020-11-04 NOTE — THERAPY WOUND CARE TREATMENT
Outpatient Rehabilitation - Wound/Debridement Treatment Note   [facility]     Patient Name: Antione Crum  : 1940  MRN: [id number]  Today's Date: 2020                 Admit Date: 2020    Visit Dx:    ICD-10-CM ICD-9-CM   1. Venous stasis  I87.8 459.81   2. Open wound of left lower leg, subsequent encounter  S81.802D V58.89     891.0       Patient Active Problem List   Diagnosis   • Acute left ankle pain   • Venous stasis   • Neuropathy   • Prediabetes   • Closed trimalleolar fracture of left ankle   • S/P ORIF left trimalleolar fracture   • HLD (hyperlipidemia)   • HTN (hypertension)   • Hypothyroid   • Leukocytosis, likely reactive   • Acute postoperative pain   • Controlled type 2 diabetes mellitus with diabetic neuropathy, without long-term current use of insulin (CMS/HCC)        Past Medical History:   Diagnosis Date   • Arthritis     multi site    • Bronchiectasis (CMS/HCC)     causes SOA   • Dyspnea    • Elevated cholesterol    • History of fall 2018    injured left ankle   • Hypertension    • Hypothyroidism    • Presence of dental bridge     upper    • Suspected sleep apnea     tested three times but ruled out with third test         Past Surgical History:   Procedure Laterality Date   • ANKLE OPEN REDUCTION INTERNAL FIXATION Left 2018    Procedure: OPEN REDUCTION INTERNAL FIXATION LEFT TRIMALL ANKLE FRACTURE  ;  Surgeon: Cheryl Palmer MD;  Location: [facility];  Service: Orthopedics   • COLONOSCOPY     • HAND SURGERY Bilateral     reconstruct thumb sockets    • TURP / TRANSURETHRAL INCISION / DRAINAGE PROSTATE  2015         EVALUATION  PT Ortho     Row Name 20 1300       Subjective Comments    Subjective Comments  Patient states he feels as if wounds are looking dry.  -MP       Subjective Pain    Able to rate subjective pain?  yes  -MP    Pre-Treatment Pain Level  1  -MP    Post-Treatment Pain Level  1  -MP       Transfers    Sit-Stand [place] (Transfers)   independent  -MP    Stand-Sit [place] (Transfers)  independent  -MP    Comment (Transfers)  seated for tx  -MP      User Key  (r) = Recorded By, (t) = Taken By, (c) = Cosigned By    Initials Name Provider Type    Juan Leyva PT Physical Therapist          LDA Wound     Row Name 11/04/20 1300             Wound 10/07/20 1430 Left lower leg Venous Ulcer    Wound - Properties Group Placement Date: 10/07/20  - Placement Time: 1430 - Side: Left  - Orientation: lower  -MF Location: leg  -MF Primary Wound Type: Venous ulcer  -MF    Dressing Appearance  intact;moist drainage  -MP      Base  moist;pink;red;slough;yellow  -MP      Periwound  intact;moist;pink  -MP      Periwound Temperature  warm  -MP      Periwound Skin Turgor  soft  -MP      Edges  open  -MP      Wound Length (cm)  0.9 cm  -MP      Wound Width (cm)  0.7 cm  -MP      Wound Depth (cm)  0.2 cm  -MP      Drainage Characteristics/Odor  serosanguineous  -MP      Drainage Amount  small  -MP      Care, Wound  cleansed with;wound cleanser;debrided;ultrasound therapy, non contact low frequency Mist x 4 minutes  -MP      Dressing Care  dressing applied;collagen;silver impregnated;low-adherent;gauze;multi-layer wrap Therahoney, carmenza, Mepilex Ag, 3'' conform, MLW  -MP      Periwound Care  cleansed with pH balanced cleanser;dry periwound area maintained  -MP      Retired Wound - Properties Group Date first assessed: 10/07/20  - Time first assessed: 1430 - Side: Left  - Location: leg  -MF Primary Wound Type: Venous ulcer  -MF       Wound 10/07/20 1430 Left upper leg Venous Ulcer    Wound - Properties Group Placement Date: 10/07/20  - Placement Time: 1430 - Side: Left  - Orientation: upper  - Location: leg  -MF Primary Wound Type: Venous ulcer  -MF    Dressing Appearance  intact;moist drainage  -MP      Base  moist;pink;red;slough;yellow  -MP      Periwound  intact;dry;moist  -MP      Periwound Temperature  warm  -MP      Periwound Skin  Turgor  soft  -MP      Edges  open  -MP      Wound Length (cm)  1.1 cm  -MP      Wound Width (cm)  0.6 cm  -MP      Wound Depth (cm)  0.2 cm  -MP      Drainage Characteristics/Odor  serosanguineous  -MP      Drainage Amount  small  -MP      Care, Wound  cleansed with;wound cleanser;debrided;ultrasound therapy, non contact low frequency Mist x 4 minutes   -MP      Dressing Care  dressing applied;collagen;silver impregnated;low-adherent;foam Therahoney, carmenza, Mepilex Ag, 3'' conform  -MP      Periwound Care  cleansed with pH balanced cleanser;dry periwound area maintained  -MP      Retired Wound - Properties Group Date first assessed: 10/07/20  - Time first assessed: 1430  - Side: Left  - Location: leg  - Primary Wound Type: Venous ulcer  -MF       Wound 10/21/20 1300 Left distal leg    Wound - Properties Group Placement Date: 10/21/20  - Placement Time: 1300  - Side: Left  - Orientation: distal  - Location: leg  -    Wound Image  Images linked: 1  -MP      Dressing Appearance  intact;dry  -MP      Base  epithelialization area appears fully epithelialized  -MP      Periwound  intact;moist;pink  -MP      Periwound Temperature  warm  -MP      Periwound Skin Turgor  soft  -MP      Edges  open  -MP      Drainage Characteristics/Odor  serosanguineous  -MP      Drainage Amount  none  -MP      Care, Wound  cleansed with;wound cleanser;debrided  -MP      Dressing Care  dressing applied;silver impregnated;foam;multi-layer wrap  -MP      Periwound Care  cleansed with pH balanced cleanser;dry periwound area maintained  -MP      Retired Wound - Properties Group Date first assessed: 10/21/20  - Time first assessed: 1300  -JM Side: Left  - Location: leg  -      User Key  (r) = Recorded By, (t) = Taken By, (c) = Cosigned By    Initials Name Provider Type    Eliazar Corea, PT Physical Therapist    Janeen Austin, PT Physical Therapist    Juan Leyva, PT Physical Therapist         Lymphedema     Row Name 11/04/20 1300             Lymphedema Edema Assessment    Ptting Edema Category  By severity  -MP      Pitting Edema  Moderate  -MP         Skin Changes/Observations    Location/Assessment  Lower Extremity  -MP      Lower Extremity Conditions  left:;clean;dry  -MP      Lower Extremity Color/Pigment  left:;hyperpigmented  -MP         Lymphedema Pulses/Capillary Refill    Lymphedema Pulses/Capillary Refill  lower extremity pulses;capillary refill  -MP      Dorsalis Pedis Pulse  right:;left: unable to assess through edema  -MP      Capillary Refill  lower extremity capillary refill  -MP      Lower Extremity Capillary Refill  left:;less than 3 seconds  -MP         Compression/Skin Care    Compression/Skin Care  skin care;wrapping location;bandaging  -MP      Skin Care  washed/dried;lotion applied  -MP      Wrapping Location  lower extremity  -MP      Wrapping Location LE  left:;foot to knee  -MP      Wrapping Comments  Wound dressings, size 4/5 compressogrip doubled and overlapping for gradient compression  -MP        User Key  (r) = Recorded By, (t) = Taken By, (c) = Cosigned By    Initials Name Provider Type    Juan Leyva, PT Physical Therapist          WOUND DEBRIDEMENT  Total area of Debridement: 2 cm2  Debridement Site 1  Location- Site 1: L LE  Selective Debridement- Site 1: Wound Surface <20cmsq  Instruments- Site 1: tweezers  Excised Tissue Description- Site 1: minimum, slough  Bleeding- Site 1: none             Therapy Education     Row Name 11/04/20 1300             Therapy Education    Education Details  PT explained addition to Therahoney to POC due to wounds appearing dry this date.  -MP      Given  Bandaging/dressing change;Edema management  -MP      Program  Modified  -MP      How Provided  Verbal;Demonstration  -MP      Provided to  Patient  -MP      Level of Understanding  Verbalized;Demonstrated  -MP        User Key  (r) = Recorded By, (t) = Taken By, (c) = Cosigned By     Initials Name Provider Type    Juan Leyva PT Physical Therapist          Recommendation and Plan  PT Assessment/Plan     Row Name 11/04/20 1300          PT Assessment    Functional Limitations  Impaired gait;Other (comment) wound care  -MP     Impairments  Integumentary integrity  -MP     Assessment Comments  Wounds measured slightly smaller this date with less slough in wound beds. Wound did appear slightly dry so PT added scant amount of Therahoney to carmenza for application. Periwound skin was crusted and dry as well. Patient would continue to benefit from skilled PT wound caer to promote increased wound healing potential.  -MP        PT Plan    PT Frequency  2x/week  -MP     Physical Therapy Interventions (Optional Details)  patient/family education;wound care  -MP     PT Plan Comments  MIST, debridement, MLW  -MP       User Key  (r) = Recorded By, (t) = Taken By, (c) = Cosigned By    Initials Name Provider Type    Juan Leyva PT Physical Therapist          Goals  PT OP Goals     Row Name 11/04/20 1300          Time Calculation    PT Goal Re-Cert Due Date  01/05/21  -MP       User Key  (r) = Recorded By, (t) = Taken By, (c) = Cosigned By    Initials Name Provider Type    Juan Leyva PT Physical Therapist          PT Goal Re-Cert Due Date: 01/05/21            Time Calculation: Start Time: 1300  Therapy Charges for Today     Code Description Service Date Service Provider Modifiers Qty    31572856403 HC SEYMOUR DEBRIDE OPEN WOUND UP TO 20CM 11/4/2020 Juan Rich, PT GP 1    [id number] HC PT NLFU MIST 11/4/2020 Juan Rich, PT GP 1    [id number] HC PT MULTI LAYER COMP SYS BELOW KNEE 11/4/2020 Juan Rich, PT GP 1                  Juan Rich PT  11/4/2020

## 2020-11-06 ENCOUNTER — APPOINTMENT (OUTPATIENT)
Dept: PHYSICAL THERAPY | Facility: HOSPITAL | Age: 80
End: 2020-11-06

## 2020-11-11 ENCOUNTER — HOSPITAL ENCOUNTER (OUTPATIENT)
Dept: PHYSICAL THERAPY | Facility: HOSPITAL | Age: 80
Setting detail: THERAPIES SERIES
Discharge: HOME | End: 2020-11-11

## 2020-11-11 DIAGNOSIS — I87.8 VENOUS STASIS: Primary | ICD-10-CM

## 2020-11-11 DIAGNOSIS — S81.802D OPEN WOUND OF LEFT LOWER LEG, SUBSEQUENT ENCOUNTER: ICD-10-CM

## 2020-11-11 PROCEDURE — 97597 DBRDMT OPN WND 1ST 20 CM/<: CPT

## 2020-11-11 PROCEDURE — 29581 APPL MULTLAYER CMPRN SYS LEG: CPT

## 2020-11-11 PROCEDURE — 97610 LOW FREQUENCY NON-THERMAL US: CPT

## 2020-11-11 NOTE — THERAPY WOUND CARE TREATMENT
Outpatient Rehabilitation - Wound/Debridement Treatment Note   [facility]     Patient Name: Antione Crum  : 1940  MRN: [id number]  Today's Date: 2020                 Admit Date: 2020    Visit Dx:    ICD-10-CM ICD-9-CM   1. Venous stasis  I87.8 459.81   2. Open wound of left lower leg, subsequent encounter  S81.802D V58.89     891.0       Patient Active Problem List   Diagnosis   • Acute left ankle pain   • Venous stasis   • Neuropathy   • Prediabetes   • Closed trimalleolar fracture of left ankle   • S/P ORIF left trimalleolar fracture   • HLD (hyperlipidemia)   • HTN (hypertension)   • Hypothyroid   • Leukocytosis, likely reactive   • Acute postoperative pain   • Controlled type 2 diabetes mellitus with diabetic neuropathy, without long-term current use of insulin (CMS/HCC)        Past Medical History:   Diagnosis Date   • Arthritis     multi site    • Bronchiectasis (CMS/HCC)     causes SOA   • Dyspnea    • Elevated cholesterol    • History of fall 2018    injured left ankle   • Hypertension    • Hypothyroidism    • Presence of dental bridge     upper    • Suspected sleep apnea     tested three times but ruled out with third test         Past Surgical History:   Procedure Laterality Date   • ANKLE OPEN REDUCTION INTERNAL FIXATION Left 2018    Procedure: OPEN REDUCTION INTERNAL FIXATION LEFT TRIMALL ANKLE FRACTURE  ;  Surgeon: Cheryl Palmer MD;  Location: [facility];  Service: Orthopedics   • COLONOSCOPY     • HAND SURGERY Bilateral     reconstruct thumb sockets    • TURP / TRANSURETHRAL INCISION / DRAINAGE PROSTATE  2015         EVALUATION  PT Ortho     Row Name 20 1300       Subjective Comments    Subjective Comments  Pt states he took everything off last night to shower, did not replace dressing, currently NIR  -JM       Subjective Pain    Able to rate subjective pain?  yes  -JM    Pre-Treatment Pain Level  1  -JM    Post-Treatment Pain Level  1  -JM       Transfers     Sit-Stand [place] (Transfers)  independent  -JM    Stand-Sit [place] (Transfers)  independent  -    Comment (Transfers)  seated for tx  -       Gait/Stairs (Locomotion)    [place] Level (Gait)  independent  -JM      User Key  (r) = Recorded By, (t) = Taken By, (c) = Cosigned By    Initials Name Provider Type    Janeen Austin PT Physical Therapist          LDA Wound     Row Name 11/11/20 1300             Wound 10/07/20 1430 Left lower leg Venous Ulcer    Wound - Properties Group Placement Date: 10/07/20  - Placement Time: 1430  - Side: Left  - Orientation: lower  - Location: leg  -MF Primary Wound Type: Venous ulcer  -MF    Dressing Appearance  open to air  -      Base  pink;red;slough;yellow;dry  -JM      Periwound  intact;moist;pink  -JM      Periwound Temperature  warm  -JM      Periwound Skin Turgor  soft  -      Edges  open  -      Drainage Characteristics/Odor  serosanguineous  -      Drainage Amount  scant  -JM      Care, Wound  cleansed with;wound cleanser;debrided;ultrasound therapy, non contact low frequency;honey applied MIST 8min total to wounds  -JM      Dressing Care  dressing applied;collagen;foam;silver impregnated;multi-layer wrap carmenza, mepilex ag, mepilex border, MLW  -JM      Periwound Care  cleansed with pH balanced cleanser;dry periwound area maintained  -      Retired Wound - Properties Group Date first assessed: 10/07/20  - Time first assessed: 1430  - Side: Left  - Location: leg  - Primary Wound Type: Venous ulcer  -MF       Wound 10/07/20 1430 Left upper leg Venous Ulcer    Wound - Properties Group Placement Date: 10/07/20  - Placement Time: 1430 - Side: Left  - Orientation: upper  - Location: leg  -MF Primary Wound Type: Venous ulcer  -MF    Dressing Appearance  open to air  -      Base  pink;red;slough;yellow;dry  -JM      Periwound  intact;dry;moist  -      Periwound Temperature  warm  -      Periwound Skin Turgor   soft  -JM      Edges  open  -JM      Drainage Characteristics/Odor  serosanguineous  -JM      Drainage Amount  scant  -JM      Care, Wound  cleansed with;wound cleanser;debrided;ultrasound therapy, non contact low frequency;honey applied  -JM      Dressing Care  dressing applied;collagen;foam;silver impregnated;multi-layer wrap therahoney, carmenza, mepilex ag, mepilex border, MLW  -JM      Periwound Care  cleansed with pH balanced cleanser;dry periwound area maintained  -JM      Retired Wound - Properties Group Date first assessed: 10/07/20  - Time first assessed: 1430  - Side: Left  - Location: leg  - Primary Wound Type: Venous ulcer  -       Wound 10/21/20 1300 Left distal leg    Wound - Properties Group Placement Date: 10/21/20  - Placement Time: 1300  -JM Side: Left  - Orientation: distal  - Location: leg  -JM    Dressing Appearance  open to air  -JM      Base  dry;red;yellow  -JM      Periwound  intact;moist;pink  -JM      Periwound Temperature  warm  -JM      Periwound Skin Turgor  soft  -JM      Edges  open  -JM      Drainage Characteristics/Odor  serous  -JM      Drainage Amount  scant  -JM      Care, Wound  cleansed with;wound cleanser;debrided;ultrasound therapy, non contact low frequency;honey applied MIST 8min total to all wounds  -JM      Dressing Care  dressing applied;foam;silver impregnated;multi-layer wrap  -JM      Periwound Care  cleansed with pH balanced cleanser;dry periwound area maintained  -JM      Retired Wound - Properties Group Date first assessed: 10/21/20  - Time first assessed: 1300  -JM Side: Left  - Location: leg  -      User Key  (r) = Recorded By, (t) = Taken By, (c) = Cosigned By    Initials Name Provider Type    Eliazar Corea, PT Physical Therapist    Janeen Austin, PT Physical Therapist        Lymphedema     Row Name 11/11/20 1300             Lymphedema Edema Assessment    Ptting Edema Category  By severity  -      Pitting Edema  Moderate   -         Skin Changes/Observations    Location/Assessment  Lower Extremity  -      Lower Extremity Conditions  left:;clean;dry;shiny  -      Lower Extremity Color/Pigment  left:;hyperpigmented  -      Lower Extremity Skin Details  min redness/shiny appearance  -         Lymphedema Pulses/Capillary Refill    Capillary Refill  lower extremity capillary refill  -      Lower Extremity Capillary Refill  left:;less than 3 seconds  -         Compression/Skin Care    Compression/Skin Care  skin care;wrapping location;bandaging  -      Skin Care  washed/dried;lotion applied  -      Wrapping Location  lower extremity  -      Wrapping Location LE  left:;foot to knee  -      Wrapping Comments  Wound dressings, size 4/5 compressogrip doubled and overlapping for gradient compression  -        User Key  (r) = Recorded By, (t) = Taken By, (c) = Cosigned By    Initials Name Provider Type    Janeen Austin, PT Physical Therapist          WOUND DEBRIDEMENT  Total area of Debridement: 2cmsq  Debridement Site 1  Location- Site 1: L LE  Selective Debridement- Site 1: Wound Surface <20cmsq  Instruments- Site 1: tweezers, #15, scapel  Excised Tissue Description- Site 1: moderate, slough, other (comment)(dried necrotic tissue)  Bleeding- Site 1: none             Therapy Education     Row Name 11/11/20 1300             Therapy Education    Education Details  Reinforced need for moist environment for wounds to heal, do not leave wounds NIR, continue with daily compression use  -      Given  Bandaging/dressing change;Edema management  -      Program  Modified  -PENNY      How Provided  Verbal;Demonstration  -      Provided to  Patient  -      Level of Understanding  Verbalized;Demonstrated  -        User Key  (r) = Recorded By, (t) = Taken By, (c) = Cosigned By    Initials Name Provider Type    Janeen Austin, PT Physical Therapist          Recommendation and Plan  PT Assessment/Plan     Row Name  11/11/20 1300          PT Assessment    Functional Limitations  Impaired gait;Other (comment) wound care  -     Impairments  Integumentary integrity  -     Assessment Comments  Wound beds dry today as pt had left wounds NIR since last night, also had removed compression and not replaced.  PT continued MIST, was able to debride dry top layer of necrotic tissue, applied therahoney, ag foam, mepilex border, and placed MLW with compressogrips.  Reinforced need for daily compression use and to not leave wounds NIR.  -        PT Plan    PT Frequency  2x/week  -     Physical Therapy Interventions (Optional Details)  patient/family education;wound care  -     PT Plan Comments  MIST, debridement, MLW  -       User Key  (r) = Recorded By, (t) = Taken By, (c) = Cosigned By    Initials Name Provider Type    Janeen Austin, PT Physical Therapist          Goals  PT OP Goals     Row Name 11/11/20 1300          Time Calculation    PT Goal Re-Cert Due Date  01/05/21  -       User Key  (r) = Recorded By, (t) = Taken By, (c) = Cosigned By    Initials Name Provider Type    Janeen Austin, PT Physical Therapist          PT Goal Re-Cert Due Date: 01/05/21            Time Calculation: Start Time: 1300  Therapy Charges for Today     Code Description Service Date Service Provider Modifiers Qty    75524441409 HC SEYMOUR DEBRIDE OPEN WOUND UP TO 20CM 11/11/2020 Janeen Martinez, PT GP 1    90427291615 HC PT NLFU MIST 11/11/2020 Janeen Martinez, PT GP 1    96379323160 HC PT MULTI LAYER COMP SYS BELOW KNEE 11/11/2020 Janeen Martinez, PT GP 1                  Janeen Martinez, PT  11/11/2020

## 2020-11-13 ENCOUNTER — APPOINTMENT (OUTPATIENT)
Dept: PHYSICAL THERAPY | Facility: HOSPITAL | Age: 80
End: 2020-11-13

## 2020-11-18 ENCOUNTER — APPOINTMENT (OUTPATIENT)
Dept: PHYSICAL THERAPY | Facility: HOSPITAL | Age: 80
End: 2020-11-18

## 2020-11-19 ENCOUNTER — APPOINTMENT (OUTPATIENT)
Dept: PHYSICAL THERAPY | Facility: HOSPITAL | Age: 80
End: 2020-11-19

## 2020-12-09 ENCOUNTER — DOCUMENTATION (OUTPATIENT)
Dept: PHYSICAL THERAPY | Facility: HOSPITAL | Age: 80
End: 2020-12-09

## 2020-12-09 DIAGNOSIS — I87.8 VENOUS STASIS: Primary | ICD-10-CM

## 2020-12-09 DIAGNOSIS — S81.802D OPEN WOUND OF LEFT LOWER LEG, SUBSEQUENT ENCOUNTER: ICD-10-CM

## 2020-12-09 NOTE — THERAPY DISCHARGE NOTE
Outpatient Rehabilitation - Wound/Debridement Discharge Summary       Patient Name: Antione Crum  : 1940  MRN: [id number]  Today's Date: 2020                  Admit Date: (Not on file)    Visit Dx:    ICD-10-CM ICD-9-CM   1. Venous stasis  I87.8 459.81   2. Open wound of left lower leg, subsequent encounter  S81.802D V58.89     891.0       Patient Active Problem List   Diagnosis   • Acute left ankle pain   • Venous stasis   • Neuropathy   • Prediabetes   • Closed trimalleolar fracture of left ankle   • S/P ORIF left trimalleolar fracture   • HLD (hyperlipidemia)   • HTN (hypertension)   • Hypothyroid   • Leukocytosis, likely reactive   • Acute postoperative pain   • Controlled type 2 diabetes mellitus with diabetic neuropathy, without long-term current use of insulin (CMS/HCC)        Past Medical History:   Diagnosis Date   • Arthritis     multi site    • Bronchiectasis (CMS/HCC)     causes SOA   • Dyspnea    • Elevated cholesterol    • History of fall 2018    injured left ankle   • Hypertension    • Hypothyroidism    • Presence of dental bridge     upper    • Suspected sleep apnea     tested three times but ruled out with third test         Past Surgical History:   Procedure Laterality Date   • ANKLE OPEN REDUCTION INTERNAL FIXATION Left 2018    Procedure: OPEN REDUCTION INTERNAL FIXATION LEFT TRIMALL ANKLE FRACTURE  ;  Surgeon: Cheryl Palmer MD;  Location: [facility];  Service: Orthopedics   • COLONOSCOPY     • HAND SURGERY Bilateral     reconstruct thumb sockets    • TURP / TRANSURETHRAL INCISION / DRAINAGE PROSTATE  2015       Goals  PT OP Goals     Row Name 20 0900          PT Short Term Goals    STG Date to Achieve  20  -     STG 1  Decrease LLE wounds by 25% each as evidence of improved wound healing  -     STG 1 Progress  Not Met  -     STG 2  Decrease LE edema to none/scant to improve skin integrity   -     STG 2 Progress  Not Met  -        Long Term  Goals    LTG Date to Achieve  01/05/21  -     LTG 1  Decrease LLE wounds by 75% each as evidence of improved skin integrity   -     LTG 1 Progress  Not Met  -     LTG 2  Pt independent with home management of wounds and LE compression   -     LTG 2 Progress  Not Met  -       User Key  (r) = Recorded By, (t) = Taken By, (c) = Cosigned By    Initials Name Provider Type    Janeen Austin, PT Physical Therapist          OP Discharge Summary     Row Name 12/09/20 0952             OP PT Discharge Summary    Date of Discharge  12/09/20  -      Reason for Discharge  other (comment) Pt has not shown for tx since 11/11/20, has no-showed/canceled last 3 appts.  -      Outcomes Achieved  Unable to make functional progress toward goals at this time  -      Discharge Destination  Unknown  -        User Key  (r) = Recorded By, (t) = Taken By, (c) = Cosigned By    Initials Name Provider Type    Janeen Austin, PT Physical Therapist          Janeen Martinez, PT  12/9/2020

## (undated) DEVICE — DRAPE,REIN 53X77,STERILE: Brand: MEDLINE

## (undated) DEVICE — INTENDED USE FOR SURGICAL MARKING ON INTACT SKIN, ALSO PROVIDES A PERMANENT METHOD OF IDENTIFYING OBJECTS IN THE OPERATING ROOM: Brand: WRITESITE® REGULAR TIP SKIN MARKER

## (undated) DEVICE — BIT DRL QC W DEPTH MARK 2X140MM

## (undated) DEVICE — 3M™ WARMING BLANKET, UPPER BODY, 10 PER CASE, 42268: Brand: BAIR HUGGER™

## (undated) DEVICE — MEDI-VAC NON-CONDUCTIVE SUCTION TUBING: Brand: CARDINAL HEALTH

## (undated) DEVICE — GLV SURG RAD SENSICARE SHLD PF LF SZ8 STRL

## (undated) DEVICE — SCRW CORT S/TAP 2.7X16MM
Type: IMPLANTABLE DEVICE | Site: ANKLE | Status: NON-FUNCTIONAL
Removed: 2018-09-04

## (undated) DEVICE — CVR HNDL LT SURG ACCSSRY BLU STRL

## (undated) DEVICE — SPLNT ORTHOGLASS UNPAD P/C 4X24IN

## (undated) DEVICE — PAD ARMBRD SURG CONVOL 7.5X20X2IN

## (undated) DEVICE — GW NON THRD 1.25X150MM

## (undated) DEVICE — ST NERV BLCK CONT CONTIPLEX ECHO CLSD 18G 4IN

## (undated) DEVICE — MEDI-VAC YANKAUER SUCTION HANDLE W/BULBOUS TIP: Brand: CARDINAL HEALTH

## (undated) DEVICE — UNDERGLV SURG BIOGEL INDICAT PF 61/2 GRN

## (undated) DEVICE — BNDG ELAS W/CLIP 6IN 10YD LF STRL

## (undated) DEVICE — SUCTION CANISTER, 2500CC, RIGID: Brand: DEROYAL

## (undated) DEVICE — SPLNT ORTHOGLASS UNPAD P/C 4X38IN

## (undated) DEVICE — COVER,LIGHT HANDLE,FLX,1/PK: Brand: MEDLINE INDUSTRIES, INC.

## (undated) DEVICE — AMD ANTIMICROBIAL GAUZE SPONGES,12 PLY USP TYPE VII, 0.2% POLYHEXAMETHYLENE BIGUANIDE HCI (PHMB): Brand: CURITY

## (undated) DEVICE — PAD,NON-ADHERENT,2X3,STERILE,LF,1/PK: Brand: MEDLINE

## (undated) DEVICE — GW THRD SPADE PT NO/COLR 1.6X150MM

## (undated) DEVICE — SCRW CORT S/TAP 2.7X12MM
Type: IMPLANTABLE DEVICE | Site: ANKLE | Status: NON-FUNCTIONAL
Removed: 2018-09-04

## (undated) DEVICE — GLV SURG SIGNATURE TOUCH PF LTX 7 STRL

## (undated) DEVICE — BIT DRL QC DIA 2.5X110MM

## (undated) DEVICE — Device

## (undated) DEVICE — DELFI MATCHING LIMB PROTECTION SLEEVES (MLPS) HELP PROTECT THE PATIENT’S LIMB FROM POSSIBLE WRINKLING, PINCHING AND SHEARING OF SKIN AND SOFT TISSUES OF THE LIMB.EACH DELFI MATCHING LIMB PROTECTION SLEEVE IS INTENDED FOR USE WITH A SPECIFIC DELFI TOURNIQUET CUFF. THIS SLEEVE IS SPECIFICALLY FOR THIGH.: Brand: MATCHING LIMB PROTECTION SLEEVES - VARI-FIT

## (undated) DEVICE — 1010 S-DRAPE TOWEL DRAPE 10/BX: Brand: STERI-DRAPE™

## (undated) DEVICE — SUT MONOCRYL PLS ANTIB UND 3/0  PS1 27IN

## (undated) DEVICE — UNDERCAST PADDING: Brand: DEROYAL

## (undated) DEVICE — SUT MNCRYL 2/0 SH 27IN UD MCP417H

## (undated) DEVICE — INTENDED FOR TISSUE SEPARATION, AND OTHER PROCEDURES THAT REQUIRE A SHARP SURGICAL BLADE TO PUNCTURE OR CUT.: Brand: BARD-PARKER ® SAFETYLOCK CARBON RIB-BACK BLADES

## (undated) DEVICE — APPL CHLORAPREP W/TINT 26ML BLU

## (undated) DEVICE — KT PUMP PAIN ONQ CBLOC SELCTAFLO 400ML

## (undated) DEVICE — SCRW LK ST STRDRV 2.7X10MM
Type: IMPLANTABLE DEVICE | Site: ANKLE | Status: NON-FUNCTIONAL
Removed: 2018-09-04

## (undated) DEVICE — SNAP KOVER: Brand: UNBRANDED

## (undated) DEVICE — CANNULA,OXY,ADULT,SUPERSOFT,W/7'TUB,UC: Brand: MEDLINE

## (undated) DEVICE — DRAPE,TOP,102X53,STERILE: Brand: MEDLINE

## (undated) DEVICE — SPNG GZ WOVN 4X4IN 12PLY 10/BX STRL

## (undated) DEVICE — WEBRIL COTTON UNDERCAST PADDING: Brand: WEBRIL

## (undated) DEVICE — BNDG ELAS ELITE V/CLOSE 6IN 5YD LF STRL

## (undated) DEVICE — DRSNG TELFA PAD NONADH STR 1S 3X8IN

## (undated) DEVICE — PK EXTREM LOWR 10